# Patient Record
Sex: FEMALE | Race: BLACK OR AFRICAN AMERICAN | NOT HISPANIC OR LATINO | URBAN - METROPOLITAN AREA
[De-identification: names, ages, dates, MRNs, and addresses within clinical notes are randomized per-mention and may not be internally consistent; named-entity substitution may affect disease eponyms.]

---

## 2024-03-14 VITALS
OXYGEN SATURATION: 100 % | HEART RATE: 75 BPM | TEMPERATURE: 98 F | HEIGHT: 62 IN | WEIGHT: 123.02 LBS | DIASTOLIC BLOOD PRESSURE: 85 MMHG | SYSTOLIC BLOOD PRESSURE: 155 MMHG | RESPIRATION RATE: 15 BRPM

## 2024-03-14 RX ORDER — SODIUM CHLORIDE 9 MG/ML
1000 INJECTION INTRAMUSCULAR; INTRAVENOUS; SUBCUTANEOUS
Refills: 0 | Status: DISCONTINUED | OUTPATIENT
Start: 2024-03-19 | End: 2024-03-20

## 2024-03-14 RX ORDER — CHLORHEXIDINE GLUCONATE 213 G/1000ML
1 SOLUTION TOPICAL ONCE
Refills: 0 | Status: DISCONTINUED | OUTPATIENT
Start: 2024-03-19 | End: 2024-03-20

## 2024-03-14 NOTE — H&P ADULT - NSHPLABSRESULTS_GEN_ALL_CORE
13.8   9.28  )-----------( 234      ( 19 Mar 2024 08:05 )             40.7       03-19    141  |  102  |  16  ----------------------------<  128<H>  3.7   |  27  |  0.79    Ca    10.7<H>      19 Mar 2024 08:05    TPro  8.7<H>  /  Alb  4.8  /  TBili  1.4<H>  /  DBili  x   /  AST  26  /  ALT  13  /  AlkPhos  109  03-19      PT/INR - ( 19 Mar 2024 08:05 )   PT: 10.9 sec;   INR: 0.95          PTT - ( 19 Mar 2024 08:05 )  PTT:30.6 sec    CARDIAC MARKERS ( 19 Mar 2024 08:05 )  x     / x     / 173 U/L / x     / 3.0 ng/mL        Urinalysis Basic - ( 19 Mar 2024 08:05 )    Color: x / Appearance: x / SG: x / pH: x  Gluc: 128 mg/dL / Ketone: x  / Bili: x / Urobili: x   Blood: x / Protein: x / Nitrite: x   Leuk Esterase: x / RBC: x / WBC x   Sq Epi: x / Non Sq Epi: x / Bacteria: x

## 2024-03-14 NOTE — H&P ADULT - ASSESSMENT
62 yo F Creole speaking PMHx of CAD with LIN x 1 to LAD 2018, HTN, HLD, pre DM presents for [facility] with staged PCI of LAD at [facility] in light of risk factors, CCS class III symptoms and known residual disease.    EKG: NSR at rate of 70bpm  ASA: III  Mallampati class: III   Anginal Class: III    -No Known Allergies    -H/H = 13.8/40.7  . Pt denies BRBPR, hematuria, hematochezia, melena. Pt endorses compliance w/ home Brilinta 60mg and Plavix 75mg stating last dose was today 3/19/24. Pt was loaded with Brilinta 180mg x1 and XXX.      -BUN/Cr = 16/0.79  Euvolemic on exam. IV NS @ 250 cc bolus followed by 75cc/hr x2  hrs started pre procedure    Sedation Plan:   Moderate  Patient Is Suitable Candidate For Sedation?     Yes    Risks & benefits of procedure and alternative therapy have been explained to the patient using Creole  #[id number] but not limited to: allergic reaction, bleeding with possible need for blood transfusion, infection, renal and vascular compromise, limb damage, arrhythmia, stroke, vessel dissection/perforation, myocardial infarction, and emergent CABG. Informed consent obtained at bedside and included in chart. 60 yo F Creole speaking PMHx of CAD with LIN x 1 to LAD 2018, HTN, HLD, pre DM presents for [facility] with staged PCI of LAD at [facility] in light of risk factors, CCS class III symptoms and known residual disease.    EKG: NSR at rate of 70bpm  ASA: III  Mallampati class: III   Anginal Class: III    -No Known Allergies    -H/H = 13.8/40.7  . Pt denies BRBPR, hematuria, hematochezia, melena. Pt endorses poor compliance w/ home Brilinta 60mg (last dose today 3/19/24) and Plavix 75mg, intermittently aspirin. Pt was loaded with Brilinta 180mg x1 and ASA 325mg x1.       -BUN/Cr = 16/0.79  Euvolemic on exam. IV NS @ 250 cc bolus followed by 75cc/hr x2  hrs started pre procedure    Sedation Plan:   Moderate  Patient Is Suitable Candidate For Sedation?     Yes    Risks & benefits of procedure and alternative therapy have been explained to the patient using Creole  #[id number] but not limited to: allergic reaction, bleeding with possible need for blood transfusion, infection, renal and vascular compromise, limb damage, arrhythmia, stroke, vessel dissection/perforation, myocardial infarction, and emergent CABG. Informed consent obtained at bedside and included in chart.

## 2024-03-14 NOTE — H&P ADULT - HISTORY OF PRESENT ILLNESS
Cardiologist: Dr. Huber Nowak  Pharmacy:   Escort:      60 yo F Creole  #[id number] PMHx of CAD with LIN x 1 to LAD 2018, HTN, HLD, pre DM, presents in light of chest pain that occurs at any time, lasts 20 minutes and radiates to L shouldr and assoicated dyspnea on exertion. Pt able to walk 4 blocks prior to developing symptoms. Pt ___ denies palpitations, orthopnea, PND, leg edema, n/v/d, fever, chills, hematochezia, hematemesis, melena, BRBPR and other symptoms.     [facility] 12/22/23: LM with moderate diffuse disease. oLAD 40%, Ostial to midLAD 25% with stent in place. MidLAD 50%. Mid to distal LAD 80% stenosed. Prox LCx 30%. Mid to distal LCx 50%. prox to mid RCA 50%. dRCA 75%.   NST 4/13/23: Small area of anterior wall ischemia.   TTE (per MD note) 6/2023: EF=68%. Mild MR.     In light of pt's risk factors, CCS class XX symptoms and abnormal prior [facility], pt presents for [facility] with staged PCI of LAD at [facility].        Cardiologist: Dr. Huber Nowak  Pharmacy: [facility]  Escort: Daughter     62 yo F Creole speaking PMHx of CAD with LIN x 1 to LAD 2018, HTN, HLD, pre DM, presents in light of chest pain that occurs at any time, lasts 20 minutes and radiates to L should and associated dyspnea on exertion. Pt able to walk 4 blocks prior to developing symptoms.Pt denies palpitations, orthopnea, PND, leg edema, n/v/d, fever, chills, hematochezia, hematemesis, melena, BRBPR and other symptoms.     [facility] 12/22/23: LM with moderate diffuse disease. oLAD 40%, Ostial to midLAD 25% with stent in place. MidLAD 50%. Mid to distal LAD 80% stenosed. Prox LCx 30%. Mid to distal LCx 50%. prox to mid RCA 50%. dRCA 75%.   NST 4/13/23: Small area of anterior wall ischemia.   TTE (per MD note) 6/2023: EF=68%. Mild MR.     In light of pt's risk factors, CCS class III symptoms and abnormal prior [facility], pt presents for [facility] with staged PCI of LAD at [facility].

## 2024-03-19 ENCOUNTER — INPATIENT (INPATIENT)
Facility: HOSPITAL | Age: 64
LOS: 0 days | Discharge: HOME | DRG: 324 | End: 2024-03-20
Attending: INTERNAL MEDICINE | Admitting: INTERNAL MEDICINE
Payer: COMMERCIAL

## 2024-03-19 LAB
A1C WITH ESTIMATED AVERAGE GLUCOSE RESULT: 5.6 % — SIGNIFICANT CHANGE UP (ref 4–5.6)
ALBUMIN SERPL ELPH-MCNC: 4.8 G/DL — SIGNIFICANT CHANGE UP (ref 3.3–5)
ALP SERPL-CCNC: 109 U/L — SIGNIFICANT CHANGE UP (ref 40–120)
ALT FLD-CCNC: 13 U/L — SIGNIFICANT CHANGE UP (ref 10–45)
ANION GAP SERPL CALC-SCNC: 12 MMOL/L — SIGNIFICANT CHANGE UP (ref 5–17)
APTT BLD: 30.6 SEC — SIGNIFICANT CHANGE UP (ref 24.5–35.6)
AST SERPL-CCNC: 26 U/L — SIGNIFICANT CHANGE UP (ref 10–40)
BASOPHILS # BLD AUTO: 0.03 K/UL — SIGNIFICANT CHANGE UP (ref 0–0.2)
BASOPHILS NFR BLD AUTO: 0.3 % — SIGNIFICANT CHANGE UP (ref 0–2)
BILIRUB SERPL-MCNC: 1.4 MG/DL — HIGH (ref 0.2–1.2)
BUN SERPL-MCNC: 16 MG/DL — SIGNIFICANT CHANGE UP (ref 7–23)
CALCIUM SERPL-MCNC: 10.7 MG/DL — HIGH (ref 8.4–10.5)
CHLORIDE SERPL-SCNC: 102 MMOL/L — SIGNIFICANT CHANGE UP (ref 96–108)
CHOLEST SERPL-MCNC: 157 MG/DL — SIGNIFICANT CHANGE UP
CK MB CFR SERPL CALC: 3 NG/ML — SIGNIFICANT CHANGE UP (ref 0–6.7)
CK SERPL-CCNC: 173 U/L — HIGH (ref 25–170)
CO2 SERPL-SCNC: 27 MMOL/L — SIGNIFICANT CHANGE UP (ref 22–31)
CREAT SERPL-MCNC: 0.79 MG/DL — SIGNIFICANT CHANGE UP (ref 0.5–1.3)
EGFR: 83 ML/MIN/1.73M2 — SIGNIFICANT CHANGE UP
EOSINOPHIL # BLD AUTO: 0.12 K/UL — SIGNIFICANT CHANGE UP (ref 0–0.5)
EOSINOPHIL NFR BLD AUTO: 1.3 % — SIGNIFICANT CHANGE UP (ref 0–6)
ESTIMATED AVERAGE GLUCOSE: 114 MG/DL — SIGNIFICANT CHANGE UP (ref 68–114)
GLUCOSE SERPL-MCNC: 128 MG/DL — HIGH (ref 70–99)
HCT VFR BLD CALC: 40.7 % — SIGNIFICANT CHANGE UP (ref 34.5–45)
HDLC SERPL-MCNC: 56 MG/DL — SIGNIFICANT CHANGE UP
HGB BLD-MCNC: 13.8 G/DL — SIGNIFICANT CHANGE UP (ref 11.5–15.5)
IMM GRANULOCYTES NFR BLD AUTO: 0.3 % — SIGNIFICANT CHANGE UP (ref 0–0.9)
INR BLD: 0.95 — SIGNIFICANT CHANGE UP (ref 0.85–1.18)
LIPID PNL WITH DIRECT LDL SERPL: 83 MG/DL — SIGNIFICANT CHANGE UP
LYMPHOCYTES # BLD AUTO: 3.57 K/UL — HIGH (ref 1–3.3)
LYMPHOCYTES # BLD AUTO: 38.5 % — SIGNIFICANT CHANGE UP (ref 13–44)
MCHC RBC-ENTMCNC: 30.6 PG — SIGNIFICANT CHANGE UP (ref 27–34)
MCHC RBC-ENTMCNC: 33.9 GM/DL — SIGNIFICANT CHANGE UP (ref 32–36)
MCV RBC AUTO: 90.2 FL — SIGNIFICANT CHANGE UP (ref 80–100)
MONOCYTES # BLD AUTO: 0.69 K/UL — SIGNIFICANT CHANGE UP (ref 0–0.9)
MONOCYTES NFR BLD AUTO: 7.4 % — SIGNIFICANT CHANGE UP (ref 2–14)
NEUTROPHILS # BLD AUTO: 4.84 K/UL — SIGNIFICANT CHANGE UP (ref 1.8–7.4)
NEUTROPHILS NFR BLD AUTO: 52.2 % — SIGNIFICANT CHANGE UP (ref 43–77)
NON HDL CHOLESTEROL: 101 MG/DL — SIGNIFICANT CHANGE UP
NRBC # BLD: 0 /100 WBCS — SIGNIFICANT CHANGE UP (ref 0–0)
PLATELET # BLD AUTO: 234 K/UL — SIGNIFICANT CHANGE UP (ref 150–400)
POTASSIUM SERPL-MCNC: 3.7 MMOL/L — SIGNIFICANT CHANGE UP (ref 3.5–5.3)
POTASSIUM SERPL-SCNC: 3.7 MMOL/L — SIGNIFICANT CHANGE UP (ref 3.5–5.3)
PROT SERPL-MCNC: 8.7 G/DL — HIGH (ref 6–8.3)
PROTHROM AB SERPL-ACNC: 10.9 SEC — SIGNIFICANT CHANGE UP (ref 9.5–13)
RBC # BLD: 4.51 M/UL — SIGNIFICANT CHANGE UP (ref 3.8–5.2)
RBC # FLD: 12.5 % — SIGNIFICANT CHANGE UP (ref 10.3–14.5)
SODIUM SERPL-SCNC: 141 MMOL/L — SIGNIFICANT CHANGE UP (ref 135–145)
TRIGL SERPL-MCNC: 89 MG/DL — SIGNIFICANT CHANGE UP
WBC # BLD: 9.28 K/UL — SIGNIFICANT CHANGE UP (ref 3.8–10.5)
WBC # FLD AUTO: 9.28 K/UL — SIGNIFICANT CHANGE UP (ref 3.8–10.5)

## 2024-03-19 RX ORDER — POTASSIUM CHLORIDE 20 MEQ
10 PACKET (EA) ORAL ONCE
Refills: 0 | Status: COMPLETED | OUTPATIENT
Start: 2024-03-19 | End: 2024-03-19

## 2024-03-19 RX ORDER — SODIUM CHLORIDE 9 MG/ML
250 INJECTION INTRAMUSCULAR; INTRAVENOUS; SUBCUTANEOUS ONCE
Refills: 0 | Status: COMPLETED | OUTPATIENT
Start: 2024-03-19 | End: 2024-03-19

## 2024-03-19 RX ORDER — ASPIRIN/CALCIUM CARB/MAGNESIUM 324 MG
325 TABLET ORAL ONCE
Refills: 0 | Status: COMPLETED | OUTPATIENT
Start: 2024-03-19 | End: 2024-03-19

## 2024-03-19 RX ORDER — ASPIRIN/CALCIUM CARB/MAGNESIUM 324 MG
81 TABLET ORAL DAILY
Refills: 0 | Status: DISCONTINUED | OUTPATIENT
Start: 2024-03-19 | End: 2024-03-19

## 2024-03-19 RX ORDER — POTASSIUM CHLORIDE 20 MEQ
20 PACKET (EA) ORAL ONCE
Refills: 0 | Status: COMPLETED | OUTPATIENT
Start: 2024-03-19 | End: 2024-03-19

## 2024-03-19 RX ORDER — TICAGRELOR 90 MG/1
1 TABLET ORAL
Qty: 60 | Refills: 0
Start: 2024-03-19 | End: 2024-04-17

## 2024-03-19 RX ORDER — CLOPIDOGREL BISULFATE 75 MG/1
600 TABLET, FILM COATED ORAL ONCE
Refills: 0 | Status: DISCONTINUED | OUTPATIENT
Start: 2024-03-19 | End: 2024-03-19

## 2024-03-19 RX ORDER — ATORVASTATIN CALCIUM 80 MG/1
40 TABLET, FILM COATED ORAL AT BEDTIME
Refills: 0 | Status: DISCONTINUED | OUTPATIENT
Start: 2024-03-19 | End: 2024-03-20

## 2024-03-19 RX ORDER — TICAGRELOR 90 MG/1
180 TABLET ORAL ONCE
Refills: 0 | Status: COMPLETED | OUTPATIENT
Start: 2024-03-19 | End: 2024-03-19

## 2024-03-19 RX ORDER — SODIUM CHLORIDE 9 MG/ML
1000 INJECTION INTRAMUSCULAR; INTRAVENOUS; SUBCUTANEOUS
Refills: 0 | Status: DISCONTINUED | OUTPATIENT
Start: 2024-03-19 | End: 2024-03-20

## 2024-03-19 RX ORDER — TICAGRELOR 90 MG/1
90 TABLET ORAL EVERY 12 HOURS
Refills: 0 | Status: DISCONTINUED | OUTPATIENT
Start: 2024-03-19 | End: 2024-03-20

## 2024-03-19 RX ORDER — METOPROLOL TARTRATE 50 MG
50 TABLET ORAL DAILY
Refills: 0 | Status: DISCONTINUED | OUTPATIENT
Start: 2024-03-19 | End: 2024-03-20

## 2024-03-19 RX ORDER — ISOSORBIDE MONONITRATE 60 MG/1
1 TABLET, EXTENDED RELEASE ORAL
Refills: 0 | DISCHARGE

## 2024-03-19 RX ORDER — LOSARTAN/HYDROCHLOROTHIAZIDE 100MG-25MG
1 TABLET ORAL
Refills: 0 | DISCHARGE

## 2024-03-19 RX ORDER — ASPIRIN/CALCIUM CARB/MAGNESIUM 324 MG
1 TABLET ORAL
Refills: 0 | DISCHARGE

## 2024-03-19 RX ORDER — ASPIRIN/CALCIUM CARB/MAGNESIUM 324 MG
81 TABLET ORAL DAILY
Refills: 0 | Status: DISCONTINUED | OUTPATIENT
Start: 2024-03-20 | End: 2024-03-20

## 2024-03-19 RX ORDER — LOSARTAN POTASSIUM 100 MG/1
100 TABLET, FILM COATED ORAL DAILY
Refills: 0 | Status: DISCONTINUED | OUTPATIENT
Start: 2024-03-19 | End: 2024-03-20

## 2024-03-19 RX ORDER — ASPIRIN/CALCIUM CARB/MAGNESIUM 324 MG
325 TABLET ORAL ONCE
Refills: 0 | Status: DISCONTINUED | OUTPATIENT
Start: 2024-03-19 | End: 2024-03-19

## 2024-03-19 RX ADMIN — SODIUM CHLORIDE 75 MILLILITER(S): 9 INJECTION INTRAMUSCULAR; INTRAVENOUS; SUBCUTANEOUS at 09:23

## 2024-03-19 RX ADMIN — Medication 20 MILLIEQUIVALENT(S): at 09:23

## 2024-03-19 RX ADMIN — SODIUM CHLORIDE 1000 MILLILITER(S): 9 INJECTION INTRAMUSCULAR; INTRAVENOUS; SUBCUTANEOUS at 09:24

## 2024-03-19 RX ADMIN — SODIUM CHLORIDE 165 MILLILITER(S): 9 INJECTION INTRAMUSCULAR; INTRAVENOUS; SUBCUTANEOUS at 13:11

## 2024-03-19 RX ADMIN — Medication 10 MILLIEQUIVALENT(S): at 09:23

## 2024-03-19 RX ADMIN — ATORVASTATIN CALCIUM 40 MILLIGRAM(S): 80 TABLET, FILM COATED ORAL at 22:14

## 2024-03-19 RX ADMIN — TICAGRELOR 90 MILLIGRAM(S): 90 TABLET ORAL at 18:19

## 2024-03-19 RX ADMIN — Medication 325 MILLIGRAM(S): at 10:21

## 2024-03-19 RX ADMIN — TICAGRELOR 180 MILLIGRAM(S): 90 TABLET ORAL at 09:23

## 2024-03-19 NOTE — DISCHARGE NOTE PROVIDER - HOSPITAL COURSE
INCOMPLETE    60 yo F Creole speaking PMHx of CAD with LIN x 1 to LAD 2018, HTN, HLD, pre DM, who presented to [facility] for recommended cardiac cath w/ possible intervention if clinically indicated, in light of pts risk factors, CCS class III anginal symptoms and abnormal prior [facility].    Pt now s/p cardiac cath (3/19/24): Shockwave and LIN x 2 mLAD (80% long lesion), dissection distal edge of stent treated with LIN (total of 3 stents), patent stent pLAD, mild LCx disease, EDP 4 mmHg. Access: L radial TR.     Pt admitted overnight for observation and telemetry monitoring. Pt seen and examined at bedside this AM without any complaints or events overnight, VSS, labs and telemetry reviewed and pt stable for discharge as discussed with Dr. Agudelo. Pt has received appropriate discharge instructions, including medication regimen, access site management and follow up with Dr. Nowak in 1-2 weeks.    Discharge medications: ASA 81mg QD, Brilinta 90 mg BID, Lipitor 40 mg QHS, Losartan-HCTZ 100mg-25mg QD, Toprol XL 50 mg QD    Cardiac Rehab (Post PCI): Education on benefits of Cardiac Rehab provided to patient: Yes, Referral and Prescription Given for Cardiac Rehab: Yes, Pt given list of locations & instructed to contact their insurance company to review list of participating providers.    GLP-1 receptor agonist/SGLT2 inhibitor meds discussed w/ patients and encouraged to discuss further with PMD or Endocrinologist at next visit.      Pt discharge copies detail cardiovascular history, medications, testing/treatments, OR patient has created a patient portal account and instructed to provide their records at their 1st appointment.   60 yo F Creole speaking PMHx of CAD with LIN x 1 to LAD 2018, HTN, HLD, pre DM, who presented to [facility] for recommended cardiac cath w/ possible intervention if clinically indicated, in light of pts risk factors, CCS class III anginal symptoms and abnormal prior [facility].    Pt now s/p cardiac cath (3/19/24): Shockwave and LIN x 2 mLAD (80% long lesion), dissection distal edge of stent treated with LIN (total of 3 stents), patent stent pLAD, mild LCx disease, EDP 4 mmHg. Access: L radial TR.     Pt admitted overnight for observation and telemetry monitoring. Pt seen and examined at bedside this AM without any complaints or events overnight, VSS, labs and telemetry reviewed and pt stable for discharge as discussed with Dr. Agudelo. Pt has received appropriate discharge instructions, including medication regimen, access site management and follow up with Dr. Nowak in 1-2 weeks.    Discharge medications: ASA 81mg QD, Brilinta 90 mg BID, Lipitor 40 mg QHS, Losartan-HCTZ 100mg-25mg QD, Toprol XL 50 mg QD    Cardiac Rehab (Post PCI): Education on benefits of Cardiac Rehab provided to patient: Yes, Referral and Prescription Given for Cardiac Rehab: Yes, Pt given list of locations & instructed to contact their insurance company to review list of participating providers.    GLP-1 receptor agonist/SGLT2 inhibitor meds discussed w/ patients and encouraged to discuss further with PMD or Endocrinologist at next visit.      Pt discharge copies detail cardiovascular history, medications, testing/treatments, OR patient has created a patient portal account and instructed to provide their records at their 1st appointment.

## 2024-03-19 NOTE — DISCHARGE NOTE PROVIDER - NSDCCPCAREPLAN_GEN_ALL_CORE_FT
PRINCIPAL DISCHARGE DIAGNOSIS  Diagnosis: CAD (coronary artery disease)  Assessment and Plan of Treatment: You were found to have blockages in the arteries of your heart, also known as Coronary Artery Disease. You underwent a cardiac catheterization on 3/19/24 and received three stents to the mid left anterior descending artery.   PLEASE CONTINUE ASPIRIN 81MG DAILY AND BRILINTA 90 MG TWICE DAILY. DO NOT STOP THESE MEDICATIONS FOR ANY REASON AS THEY ARE KEEPING YOUR STENT OPEN AND PREVENTING A HEART ATTACK.   Avoid strenuous activity or heavy lifting anything more than 5lbs for the next five days. Do not take a bath or swim for the next five days; you may shower. For any bleeding or hematoma formation (hardened blood collection under the skin) at the access site of your left wrist please hold pressure and go to the emergency room. Please follow up with Dr. Nowak in 1-2 weeks. For recurrent chest pain, please call your doctor or go to the emergency room.   We have provided you with a prescription for cardiac rehab which is medically supervised exercise program for your heart and has been shown to improve the quantity and quality of life of people with heart disease like yours. You should attend cardiac rehab 3 times per week for 12 weeks. We have provided you with a list of nearby facilities. Please call your insurance carrier to determine which of these facilities are covered under your plan. Please bring this prescription with you to your follow up appointment with your cardiologist who can then further assist you to enroll into a cardiac rehab program.         SECONDARY DISCHARGE DIAGNOSES  Diagnosis: HTN (hypertension)  Assessment and Plan of Treatment: Please continue Losartan-Hydrochlorothiazide 100mg-25mg daily and Metoprolol succinate 50 mg daily as listed to keep your blood pressure controlled. For blood pressure that is too high or too low please see your doctor or go to the emergency room as necessary.    Diagnosis: HLD (hyperlipidemia)  Assessment and Plan of Treatment: Please continue Atorvastatin 40 mg at bedtime to keep your cholesterol low. High cholesterol contributes to heart disease.

## 2024-03-19 NOTE — DISCHARGE NOTE PROVIDER - CARE PROVIDER_API CALL
Huber Nowak  Cardiovascular Disease  [address] [place] [id number]  Phone: [phone]  Fax: [phone]  Established Patient  Follow Up Time: 1 week

## 2024-03-19 NOTE — DISCHARGE NOTE PROVIDER - NSDCMRMEDTOKEN_GEN_ALL_CORE_FT
Brilinta (ticagrelor) 60 mg oral tablet: 1 tab(s) orally 2 times a day  Brilinta (ticagrelor) 90 mg oral tablet: 1 tab(s) orally every 12 hours  Lipitor 40 mg oral tablet: 1 tab(s) orally once a day (at bedtime)  losartan-hydroCHLOROthiazide 100 mg-25 mg oral tablet: 1 tab(s) orally once a day  Plavix 75 mg oral tablet: 1 tab(s) orally once a day  Toprol-XL 50 mg oral tablet, extended release: 1 tab(s) orally once a day   Aspirin EC 81 mg oral delayed release tablet: 1 tab(s) orally once a day  atorvastatin 80 mg oral tablet: 1 tab(s) orally once a day  Cardiac Rehabilitation: 3x / week for 12 weeks for diagnosis of coronary artery disease (cardiologist: Dr. Huber Nowak) MDD: .  losartan-hydroCHLOROthiazide 100 mg-25 mg oral tablet: 1 tab(s) orally once a day  ticagrelor 90 mg oral tablet: 1 tab(s) orally once a day  Toprol-XL 50 mg oral tablet, extended release: 1 tab(s) orally once a day

## 2024-03-19 NOTE — PATIENT PROFILE ADULT - FALL HARM RISK - UNIVERSAL INTERVENTIONS
Bed in lowest position, wheels locked, appropriate side rails in place/Call bell, personal items and telephone in reach/Instruct patient to call for assistance before getting out of bed or chair/Non-slip footwear when patient is out of bed/[place] to call system/Physically safe environment - no spills, clutter or unnecessary equipment/Purposeful Proactive Rounding/Room/bathroom lighting operational, light cord in reach

## 2024-03-20 VITALS
SYSTOLIC BLOOD PRESSURE: 112 MMHG | TEMPERATURE: 98 F | RESPIRATION RATE: 18 BRPM | OXYGEN SATURATION: 100 % | HEART RATE: 68 BPM | DIASTOLIC BLOOD PRESSURE: 61 MMHG

## 2024-03-20 LAB
ALBUMIN SERPL ELPH-MCNC: 3.8 G/DL — SIGNIFICANT CHANGE UP (ref 3.3–5)
ALP SERPL-CCNC: 86 U/L — SIGNIFICANT CHANGE UP (ref 40–120)
ALT FLD-CCNC: 10 U/L — SIGNIFICANT CHANGE UP (ref 10–45)
ANION GAP SERPL CALC-SCNC: 8 MMOL/L — SIGNIFICANT CHANGE UP (ref 5–17)
AST SERPL-CCNC: 22 U/L — SIGNIFICANT CHANGE UP (ref 10–40)
BASOPHILS # BLD AUTO: 0.03 K/UL — SIGNIFICANT CHANGE UP (ref 0–0.2)
BASOPHILS NFR BLD AUTO: 0.4 % — SIGNIFICANT CHANGE UP (ref 0–2)
BILIRUB SERPL-MCNC: 1.7 MG/DL — HIGH (ref 0.2–1.2)
BUN SERPL-MCNC: 14 MG/DL — SIGNIFICANT CHANGE UP (ref 7–23)
CALCIUM SERPL-MCNC: 9.7 MG/DL — SIGNIFICANT CHANGE UP (ref 8.4–10.5)
CHLORIDE SERPL-SCNC: 107 MMOL/L — SIGNIFICANT CHANGE UP (ref 96–108)
CO2 SERPL-SCNC: 24 MMOL/L — SIGNIFICANT CHANGE UP (ref 22–31)
CREAT SERPL-MCNC: 0.81 MG/DL — SIGNIFICANT CHANGE UP (ref 0.5–1.3)
EGFR: 81 ML/MIN/1.73M2 — SIGNIFICANT CHANGE UP
EOSINOPHIL # BLD AUTO: 0.16 K/UL — SIGNIFICANT CHANGE UP (ref 0–0.5)
EOSINOPHIL NFR BLD AUTO: 2 % — SIGNIFICANT CHANGE UP (ref 0–6)
GLUCOSE SERPL-MCNC: 86 MG/DL — SIGNIFICANT CHANGE UP (ref 70–99)
HCT VFR BLD CALC: 36.5 % — SIGNIFICANT CHANGE UP (ref 34.5–45)
HCV AB S/CO SERPL IA: 0.05 S/CO — SIGNIFICANT CHANGE UP
HCV AB SERPL-IMP: SIGNIFICANT CHANGE UP
HGB BLD-MCNC: 12.1 G/DL — SIGNIFICANT CHANGE UP (ref 11.5–15.5)
IMM GRANULOCYTES NFR BLD AUTO: 0.4 % — SIGNIFICANT CHANGE UP (ref 0–0.9)
LYMPHOCYTES # BLD AUTO: 2.72 K/UL — SIGNIFICANT CHANGE UP (ref 1–3.3)
LYMPHOCYTES # BLD AUTO: 33.3 % — SIGNIFICANT CHANGE UP (ref 13–44)
MAGNESIUM SERPL-MCNC: 1.8 MG/DL — SIGNIFICANT CHANGE UP (ref 1.6–2.6)
MCHC RBC-ENTMCNC: 31.1 PG — SIGNIFICANT CHANGE UP (ref 27–34)
MCHC RBC-ENTMCNC: 33.2 GM/DL — SIGNIFICANT CHANGE UP (ref 32–36)
MCV RBC AUTO: 93.8 FL — SIGNIFICANT CHANGE UP (ref 80–100)
MONOCYTES # BLD AUTO: 0.74 K/UL — SIGNIFICANT CHANGE UP (ref 0–0.9)
MONOCYTES NFR BLD AUTO: 9.1 % — SIGNIFICANT CHANGE UP (ref 2–14)
NEUTROPHILS # BLD AUTO: 4.49 K/UL — SIGNIFICANT CHANGE UP (ref 1.8–7.4)
NEUTROPHILS NFR BLD AUTO: 54.8 % — SIGNIFICANT CHANGE UP (ref 43–77)
NRBC # BLD: 0 /100 WBCS — SIGNIFICANT CHANGE UP (ref 0–0)
PLATELET # BLD AUTO: 202 K/UL — SIGNIFICANT CHANGE UP (ref 150–400)
POTASSIUM SERPL-MCNC: 4.3 MMOL/L — SIGNIFICANT CHANGE UP (ref 3.5–5.3)
POTASSIUM SERPL-SCNC: 4.3 MMOL/L — SIGNIFICANT CHANGE UP (ref 3.5–5.3)
PROT SERPL-MCNC: 7 G/DL — SIGNIFICANT CHANGE UP (ref 6–8.3)
RBC # BLD: 3.89 M/UL — SIGNIFICANT CHANGE UP (ref 3.8–5.2)
RBC # FLD: 12.8 % — SIGNIFICANT CHANGE UP (ref 10.3–14.5)
SODIUM SERPL-SCNC: 139 MMOL/L — SIGNIFICANT CHANGE UP (ref 135–145)
WBC # BLD: 8.17 K/UL — SIGNIFICANT CHANGE UP (ref 3.8–10.5)
WBC # FLD AUTO: 8.17 K/UL — SIGNIFICANT CHANGE UP (ref 3.8–10.5)

## 2024-03-20 RX ORDER — TICAGRELOR 90 MG/1
1 TABLET ORAL
Refills: 0 | DISCHARGE

## 2024-03-20 RX ORDER — METOPROLOL TARTRATE 50 MG
1 TABLET ORAL
Qty: 30 | Refills: 2
Start: 2024-03-20 | End: 2024-06-17

## 2024-03-20 RX ORDER — CLOPIDOGREL BISULFATE 75 MG/1
1 TABLET, FILM COATED ORAL
Refills: 0 | DISCHARGE

## 2024-03-20 RX ORDER — LOSARTAN/HYDROCHLOROTHIAZIDE 100MG-25MG
1 TABLET ORAL
Refills: 0 | DISCHARGE

## 2024-03-20 RX ORDER — TICAGRELOR 90 MG/1
1 TABLET ORAL
Qty: 60 | Refills: 11
Start: 2024-03-20 | End: 2025-03-14

## 2024-03-20 RX ORDER — ATORVASTATIN CALCIUM 80 MG/1
1 TABLET, FILM COATED ORAL
Refills: 0 | DISCHARGE

## 2024-03-20 RX ORDER — LOSARTAN/HYDROCHLOROTHIAZIDE 100MG-25MG
1 TABLET ORAL
Qty: 30 | Refills: 2
Start: 2024-03-20 | End: 2024-06-17

## 2024-03-20 RX ORDER — METOPROLOL TARTRATE 50 MG
1 TABLET ORAL
Refills: 0 | DISCHARGE

## 2024-03-20 RX ORDER — ASPIRIN/CALCIUM CARB/MAGNESIUM 324 MG
1 TABLET ORAL
Qty: 30 | Refills: 11
Start: 2024-03-20 | End: 2025-03-14

## 2024-03-20 RX ORDER — TICAGRELOR 90 MG/1
1 TABLET ORAL
Qty: 30 | Refills: 11
Start: 2024-03-20 | End: 2025-03-14

## 2024-03-20 RX ORDER — ATORVASTATIN CALCIUM 80 MG/1
1 TABLET, FILM COATED ORAL
Qty: 30 | Refills: 2
Start: 2024-03-20 | End: 2024-06-17

## 2024-03-20 RX ORDER — MAGNESIUM OXIDE 400 MG ORAL TABLET 241.3 MG
800 TABLET ORAL ONCE
Refills: 0 | Status: COMPLETED | OUTPATIENT
Start: 2024-03-20 | End: 2024-03-20

## 2024-03-20 RX ADMIN — MAGNESIUM OXIDE 400 MG ORAL TABLET 800 MILLIGRAM(S): 241.3 TABLET ORAL at 12:59

## 2024-03-20 RX ADMIN — LOSARTAN POTASSIUM 100 MILLIGRAM(S): 100 TABLET, FILM COATED ORAL at 06:12

## 2024-03-20 RX ADMIN — TICAGRELOR 90 MILLIGRAM(S): 90 TABLET ORAL at 06:13

## 2024-03-20 RX ADMIN — Medication 81 MILLIGRAM(S): at 12:59

## 2024-03-20 RX ADMIN — Medication 50 MILLIGRAM(S): at 06:13

## 2024-03-20 NOTE — DISCHARGE NOTE NURSING/CASE MANAGEMENT/SOCIAL WORK - PATIENT PORTAL LINK FT
You can access the FollowMyHealth Patient Portal offered by [facility] by registering at the following website: [email or web address]. By joining [facility]’s FollowMyHealth portal, you will also be able to view your health information using other applications (apps) compatible with our system.

## 2024-03-21 LAB
ISTAT ACTK (ACTIVATED CLOTTING TIME KAOLIN): 250 SEC — HIGH (ref 74–137)
ISTAT ACTK (ACTIVATED CLOTTING TIME KAOLIN): 282 SEC — HIGH (ref 74–137)

## 2024-03-25 DIAGNOSIS — I25.84 CORONARY ATHEROSCLEROSIS DUE TO CALCIFIED CORONARY LESION: ICD-10-CM

## 2024-03-25 DIAGNOSIS — I10 ESSENTIAL (PRIMARY) HYPERTENSION: ICD-10-CM

## 2024-03-25 DIAGNOSIS — I25.10 ATHEROSCLEROTIC HEART DISEASE OF NATIVE CORONARY ARTERY WITHOUT ANGINA PECTORIS: ICD-10-CM

## 2024-03-25 DIAGNOSIS — Z95.5 PRESENCE OF CORONARY ANGIOPLASTY IMPLANT AND GRAFT: ICD-10-CM

## 2024-03-25 DIAGNOSIS — Z79.02 LONG TERM (CURRENT) USE OF ANTITHROMBOTICS/ANTIPLATELETS: ICD-10-CM

## 2024-03-25 DIAGNOSIS — E78.5 HYPERLIPIDEMIA, UNSPECIFIED: ICD-10-CM

## 2024-03-25 DIAGNOSIS — R73.03 PREDIABETES: ICD-10-CM

## 2024-05-06 ENCOUNTER — EMERGENCY (EMERGENCY)
Facility: HOSPITAL | Age: 64
LOS: 0 days | Discharge: ANOTHER HOSPITAL | End: 2024-05-06
Attending: STUDENT IN AN ORGANIZED HEALTH CARE EDUCATION/TRAINING PROGRAM
Payer: MEDICAID

## 2024-05-06 ENCOUNTER — INPATIENT (INPATIENT)
Facility: HOSPITAL | Age: 64
LOS: 3 days | Discharge: HOME | End: 2024-05-10
Attending: HOSPITALIST | Admitting: HOSPITALIST
Payer: MEDICAID

## 2024-05-06 VITALS
RESPIRATION RATE: 16 BRPM | OXYGEN SATURATION: 100 % | HEIGHT: 62 IN | SYSTOLIC BLOOD PRESSURE: 152 MMHG | HEART RATE: 111 BPM | TEMPERATURE: 99 F | DIASTOLIC BLOOD PRESSURE: 85 MMHG

## 2024-05-06 VITALS
WEIGHT: 119.93 LBS | RESPIRATION RATE: 20 BRPM | SYSTOLIC BLOOD PRESSURE: 156 MMHG | HEART RATE: 121 BPM | DIASTOLIC BLOOD PRESSURE: 85 MMHG | TEMPERATURE: 98 F | OXYGEN SATURATION: 100 %

## 2024-05-06 VITALS
SYSTOLIC BLOOD PRESSURE: 151 MMHG | TEMPERATURE: 99 F | DIASTOLIC BLOOD PRESSURE: 74 MMHG | RESPIRATION RATE: 18 BRPM | HEART RATE: 98 BPM | OXYGEN SATURATION: 100 %

## 2024-05-06 DIAGNOSIS — I10 ESSENTIAL (PRIMARY) HYPERTENSION: ICD-10-CM

## 2024-05-06 DIAGNOSIS — R00.2 PALPITATIONS: ICD-10-CM

## 2024-05-06 DIAGNOSIS — D64.9 ANEMIA, UNSPECIFIED: ICD-10-CM

## 2024-05-06 DIAGNOSIS — E78.5 HYPERLIPIDEMIA, UNSPECIFIED: ICD-10-CM

## 2024-05-06 DIAGNOSIS — R42 DIZZINESS AND GIDDINESS: ICD-10-CM

## 2024-05-06 DIAGNOSIS — I25.10 ATHEROSCLEROTIC HEART DISEASE OF NATIVE CORONARY ARTERY WITHOUT ANGINA PECTORIS: ICD-10-CM

## 2024-05-06 DIAGNOSIS — K92.2 GASTROINTESTINAL HEMORRHAGE, UNSPECIFIED: ICD-10-CM

## 2024-05-06 LAB
ABO RH CONFIRMATION: SIGNIFICANT CHANGE UP
ALBUMIN SERPL ELPH-MCNC: 3.1 G/DL — LOW (ref 3.3–5)
ALP SERPL-CCNC: 83 U/L — SIGNIFICANT CHANGE UP (ref 40–120)
ALT FLD-CCNC: 22 U/L — SIGNIFICANT CHANGE UP (ref 12–78)
ANION GAP SERPL CALC-SCNC: 8 MMOL/L — SIGNIFICANT CHANGE UP (ref 5–17)
APTT BLD: 27 SEC — SIGNIFICANT CHANGE UP (ref 24.5–35.6)
AST SERPL-CCNC: 25 U/L — SIGNIFICANT CHANGE UP (ref 15–37)
BASOPHILS # BLD AUTO: 0.04 K/UL — SIGNIFICANT CHANGE UP (ref 0–0.2)
BASOPHILS # BLD AUTO: 0.04 K/UL — SIGNIFICANT CHANGE UP (ref 0–0.2)
BASOPHILS NFR BLD AUTO: 0.3 % — SIGNIFICANT CHANGE UP (ref 0–2)
BASOPHILS NFR BLD AUTO: 0.4 % — SIGNIFICANT CHANGE UP (ref 0–2)
BILIRUB SERPL-MCNC: 1.1 MG/DL — SIGNIFICANT CHANGE UP (ref 0.2–1.2)
BLD GP AB SCN SERPL QL: SIGNIFICANT CHANGE UP
BUN SERPL-MCNC: 25 MG/DL — HIGH (ref 7–23)
CALCIUM SERPL-MCNC: 8.9 MG/DL — SIGNIFICANT CHANGE UP (ref 8.5–10.1)
CHLORIDE SERPL-SCNC: 111 MMOL/L — HIGH (ref 96–108)
CO2 SERPL-SCNC: 22 MMOL/L — SIGNIFICANT CHANGE UP (ref 22–31)
CREAT SERPL-MCNC: 0.7 MG/DL — SIGNIFICANT CHANGE UP (ref 0.5–1.3)
EGFR: 96 ML/MIN/1.73M2 — SIGNIFICANT CHANGE UP
EOSINOPHIL # BLD AUTO: 0.01 K/UL — SIGNIFICANT CHANGE UP (ref 0–0.5)
EOSINOPHIL # BLD AUTO: 0.13 K/UL — SIGNIFICANT CHANGE UP (ref 0–0.5)
EOSINOPHIL NFR BLD AUTO: 0.1 % — SIGNIFICANT CHANGE UP (ref 0–6)
EOSINOPHIL NFR BLD AUTO: 1.1 % — SIGNIFICANT CHANGE UP (ref 0–6)
FERRITIN SERPL-MCNC: 66 NG/ML — SIGNIFICANT CHANGE UP (ref 13–330)
FOLATE SERPL-MCNC: 16 NG/ML — SIGNIFICANT CHANGE UP
GLUCOSE SERPL-MCNC: 150 MG/DL — HIGH (ref 70–99)
HCT VFR BLD CALC: 20 % — CRITICAL LOW (ref 34.5–45)
HCT VFR BLD CALC: 22.9 % — LOW (ref 34.5–45)
HGB BLD-MCNC: 7 G/DL — CRITICAL LOW (ref 11.5–15.5)
HGB BLD-MCNC: 7.9 G/DL — LOW (ref 11.5–15.5)
IANC: 6.13 K/UL — SIGNIFICANT CHANGE UP (ref 1.8–7.4)
IMM GRANULOCYTES NFR BLD AUTO: 0.3 % — SIGNIFICANT CHANGE UP (ref 0–0.9)
IMM GRANULOCYTES NFR BLD AUTO: 0.6 % — SIGNIFICANT CHANGE UP (ref 0–0.9)
INR BLD: 1.03 RATIO — SIGNIFICANT CHANGE UP (ref 0.85–1.18)
IRON SATN MFR SERPL: 132 UG/DL — SIGNIFICANT CHANGE UP (ref 30–160)
IRON SATN MFR SERPL: 40 % — SIGNIFICANT CHANGE UP (ref 14–50)
LYMPHOCYTES # BLD AUTO: 2.23 K/UL — SIGNIFICANT CHANGE UP (ref 1–3.3)
LYMPHOCYTES # BLD AUTO: 20 % — SIGNIFICANT CHANGE UP (ref 13–44)
LYMPHOCYTES # BLD AUTO: 3.99 K/UL — HIGH (ref 1–3.3)
LYMPHOCYTES # BLD AUTO: 34.9 % — SIGNIFICANT CHANGE UP (ref 13–44)
MAGNESIUM SERPL-MCNC: 1.9 MG/DL — SIGNIFICANT CHANGE UP (ref 1.6–2.6)
MCHC RBC-ENTMCNC: 31 PG — SIGNIFICANT CHANGE UP (ref 27–34)
MCHC RBC-ENTMCNC: 32.3 PG — SIGNIFICANT CHANGE UP (ref 27–34)
MCHC RBC-ENTMCNC: 34.5 GM/DL — SIGNIFICANT CHANGE UP (ref 32–36)
MCHC RBC-ENTMCNC: 35 G/DL — SIGNIFICANT CHANGE UP (ref 32–36)
MCV RBC AUTO: 89.8 FL — SIGNIFICANT CHANGE UP (ref 80–100)
MCV RBC AUTO: 92.2 FL — SIGNIFICANT CHANGE UP (ref 80–100)
MONOCYTES # BLD AUTO: 0.74 K/UL — SIGNIFICANT CHANGE UP (ref 0–0.9)
MONOCYTES # BLD AUTO: 1.1 K/UL — HIGH (ref 0–0.9)
MONOCYTES NFR BLD AUTO: 6.6 % — SIGNIFICANT CHANGE UP (ref 2–14)
MONOCYTES NFR BLD AUTO: 9.6 % — SIGNIFICANT CHANGE UP (ref 2–14)
NEUTROPHILS # BLD AUTO: 6.13 K/UL — SIGNIFICANT CHANGE UP (ref 1.8–7.4)
NEUTROPHILS # BLD AUTO: 8.06 K/UL — HIGH (ref 1.8–7.4)
NEUTROPHILS NFR BLD AUTO: 53.8 % — SIGNIFICANT CHANGE UP (ref 43–77)
NEUTROPHILS NFR BLD AUTO: 72.3 % — SIGNIFICANT CHANGE UP (ref 43–77)
NRBC # BLD: 0 /100 WBCS — SIGNIFICANT CHANGE UP (ref 0–0)
NRBC # BLD: 0 /100 WBCS — SIGNIFICANT CHANGE UP (ref 0–0)
NRBC # FLD: 0.02 K/UL — HIGH (ref 0–0)
NT-PROBNP SERPL-SCNC: 247 PG/ML — HIGH (ref 0–125)
OB PNL STL: POSITIVE
PLATELET # BLD AUTO: 165 K/UL — SIGNIFICANT CHANGE UP (ref 150–400)
PLATELET # BLD AUTO: 174 K/UL — SIGNIFICANT CHANGE UP (ref 150–400)
POTASSIUM SERPL-MCNC: 3.9 MMOL/L — SIGNIFICANT CHANGE UP (ref 3.5–5.3)
POTASSIUM SERPL-SCNC: 3.9 MMOL/L — SIGNIFICANT CHANGE UP (ref 3.5–5.3)
PROT SERPL-MCNC: 6.5 GM/DL — SIGNIFICANT CHANGE UP (ref 6–8.3)
PROTHROM AB SERPL-ACNC: 12.3 SEC — SIGNIFICANT CHANGE UP (ref 9.5–13)
RBC # BLD: 2.13 M/UL — LOW (ref 3.8–5.2)
RBC # BLD: 2.17 M/UL — LOW (ref 3.8–5.2)
RBC # BLD: 2.55 M/UL — LOW (ref 3.8–5.2)
RBC # FLD: 13.9 % — SIGNIFICANT CHANGE UP (ref 10.3–14.5)
RBC # FLD: 14.2 % — SIGNIFICANT CHANGE UP (ref 10.3–14.5)
RETICS #: 88.8 K/UL — SIGNIFICANT CHANGE UP (ref 25–125)
RETICS/RBC NFR: 4.2 % — HIGH (ref 0.5–2.5)
SODIUM SERPL-SCNC: 141 MMOL/L — SIGNIFICANT CHANGE UP (ref 135–145)
TIBC SERPL-MCNC: 332 UG/DL — SIGNIFICANT CHANGE UP (ref 220–430)
TROPONIN I, HIGH SENSITIVITY RESULT: 48.7 NG/L — SIGNIFICANT CHANGE UP
TSH SERPL-MCNC: 2.59 UU/ML — SIGNIFICANT CHANGE UP (ref 0.36–3.74)
UIBC SERPL-MCNC: 200 UG/DL — SIGNIFICANT CHANGE UP (ref 110–370)
VIT B12 SERPL-MCNC: 312 PG/ML — SIGNIFICANT CHANGE UP (ref 232–1245)
WBC # BLD: 11.15 K/UL — HIGH (ref 3.8–10.5)
WBC # BLD: 11.43 K/UL — HIGH (ref 3.8–10.5)
WBC # FLD AUTO: 11.15 K/UL — HIGH (ref 3.8–10.5)
WBC # FLD AUTO: 11.43 K/UL — HIGH (ref 3.8–10.5)

## 2024-05-06 RX ORDER — METOPROLOL TARTRATE 50 MG
50 TABLET ORAL ONCE
Refills: 0 | Status: DISCONTINUED | OUTPATIENT
Start: 2024-05-06 | End: 2024-05-06

## 2024-05-06 RX ORDER — PANTOPRAZOLE SODIUM 20 MG/1
80 TABLET, DELAYED RELEASE ORAL ONCE
Refills: 0 | Status: COMPLETED | OUTPATIENT
Start: 2024-05-06 | End: 2024-05-06

## 2024-05-06 RX ORDER — SODIUM CHLORIDE 9 MG/ML
1000 INJECTION, SOLUTION INTRAVENOUS ONCE
Refills: 0 | Status: COMPLETED | OUTPATIENT
Start: 2024-05-06 | End: 2024-05-06

## 2024-05-06 RX ADMIN — PANTOPRAZOLE SODIUM 80 MILLIGRAM(S): 20 TABLET, DELAYED RELEASE ORAL at 11:00

## 2024-05-06 RX ADMIN — SODIUM CHLORIDE 1000 MILLILITER(S): 9 INJECTION, SOLUTION INTRAVENOUS at 09:59

## 2024-05-06 NOTE — ED PROVIDER NOTE - PHYSICAL EXAMINATION
Gen: aox3, nad,   Head: NCAT  ENT: Airway patent, moist mucous membranes, nasal passageways clear, no pharyngeal erythema or exudates, uvula midline, no cervical lymphadenopathy  Cardiac: elevated rate, normal rhythm, no murmurs   Respiratory: Lungs CTA B/L  Gastrointestinal: Abdomen soft, nontender, nondistended, no rebound, no guarding  MSK: No gross abnormalities, FROM of all four extremities, no edema  HEME: Extremities warm, pulses intact and symmetrical in all four extremities  Skin: No rashes, no lesions  Neuro: No gross neurologic deficits, CN II-XII intact, no facial asymmetry, no dysarthria, no dysmetria, no drift, strength equal in all four extremities, gait exam deferred due to dizziness Gen: aox3, nad,   Head: NCAT  ENT: Airway patent, moist mucous membranes, nasal passageways clear, no pharyngeal erythema or exudates, uvula midline, no cervical lymphadenopathy  Cardiac: elevated rate, normal rhythm, no murmurs   Respiratory: Lungs CTA B/L  Gastrointestinal: Abdomen soft, nontender, nondistended, no rebound, no guarding  rectal : chaperoned by JOLEEN Retana - melena, no bright red blood or hemorrhoids   MSK: No gross abnormalities, FROM of all four extremities, no edema  HEME: Extremities warm, pulses intact and symmetrical in all four extremities  Skin: No rashes, no lesions  Neuro: No gross neurologic deficits, CN II-XII intact, no facial asymmetry, no dysarthria, no dysmetria, no drift, strength equal in all four extremities, gait exam deferred due to dizziness

## 2024-05-06 NOTE — ED ADULT NURSE NOTE - OBJECTIVE STATEMENT
Patient received in 6A, A&Ox3 ambulatory at baseline transfer from [facility]: stents currently on Brilinta presenting to ED c/o dizziness and fatigue x1 day. Pt received PRBCs at [facility]. Arrived with left 20g intact. Denies CP, SOB, n/v/d, fever chills, abdominal pain, visual changes, urinary symptoms. Breathing even and unlabored. CBC sent

## 2024-05-06 NOTE — ED ADULT NURSE REASSESSMENT NOTE - NS ED NURSE REASSESS COMMENT FT1
no s/s of blood transfusion reaction at this time
patient educated on blood transfusion consent form signed  s/s of blood transfusion reaction patient on cardiac monitor
Assuming patient's care for coverage. Report received from RN and patient informed during rounding. Assessment available on KB. Will continue to monitor   Pt resting comfortably at this time. No s/s of pain noted. Verbalized understanding of plan of care. Confirmation T&C sent at this time as protocol. Awaiting blood bank to be ready for transfusion to start

## 2024-05-06 NOTE — ED ADULT NURSE NOTE - CHIEF COMPLAINT QUOTE
Pt presents as transfer from [facility] for low hemoglobin and interventional cardiology for recent cardiac stent placement currently on Brilinta. Pt endorses feeling dizzy/lightheaded since last week, denies shortness of breath, afebrile. Pt received one unit of PRBC's at other facility, denies any present pain.

## 2024-05-06 NOTE — ED ADULT NURSE NOTE - IS THE PATIENT ABLE TO BE SCREENED?
Medication:Omeprazole  Last prescribing provider:Dr. Campos  Last clinic visit date: 3/5/2024  Recommendations for requested medication:n/a  Any other pertinent information:pt has provider visit today routing to Dr. Campos     Yes

## 2024-05-06 NOTE — ED PROVIDER NOTE - PROGRESS NOTE DETAILS
SONYA Beaulieu PGY1- cardiology evaluated patient, recommended stopping brilinta for GI eval/scope, continuing baby ASA. note to follow. will admit

## 2024-05-06 NOTE — ED PROVIDER NOTE - ATTENDING CONTRIBUTION TO CARE
I performed a face-to-face evaluation of the patient and performed a history and physical examination. I agree with the history and physical examination. If this was a PA visit, I personally saw the patient with the PA and performed a substantive portion of the visit including all aspects of the medical decision making.    Anemia likely 2/2 GIB 2/2 DAPT. Check Hb. Call Cards. Notify GI. Admit.

## 2024-05-06 NOTE — CHART NOTE - NSCHARTNOTEFT_GEN_A_CORE
Patient presents with likely upper GI bleed and significant drop in Hb ( 5 points, current Hb 7, was 12 before). Patient with h/o CAD s/p PCI with STONEY x1 to LAD in 2018 and recent PCI in 03/19/2024 ( DESx 2 to mLAD for 80% long lesion, dissection distal edge of stent treated with STONEY, total 3 stents), patent stent pLAD, mild LCx disease. Discussed with GI Dr Calle and Cardiology oncall, recommended to transfer to [facility] given signigicant cardiac history with recent 3 stents to LAD, GI bleed with significant drop in Hb. Discussed with ED team Patient presents with likely upper GI bleed and significant drop in Hb ( 5 points, current Hb 7, was 12 before). Patient with h/o CAD s/p PCI with STONEY x1 to LAD in 2018 and recent PCI in 03/19/2024 ( DESx 2 to mLAD for 80% long lesion, dissection distal edge of stent treated with STONEY, total 3 stents), patent stent pLAD, mild LCx disease. Discussed with GI Dr Calle and Cardiology oncall, recommended to transfer to [facility] given signigicant cardiac history with recent 3 stents to LAD, GI bleed with significant drop in Hb. Discussed with ED team      Kelechi Rae MD FACP  Hospitalist

## 2024-05-06 NOTE — ED ADULT TRIAGE NOTE - CHIEF COMPLAINT QUOTE
Pt presents as transfer from [facility] for low hemoglobin and interventional cardiology for recent cardiac stent placement currently on Brilinta. Pt endorses feeling dizzy/lightheaded since last week, denies shortness of breath, afebrile. Pt received one unit of PRBC's at other facility, denies any present pain.

## 2024-05-06 NOTE — ED PROVIDER NOTE - OBJECTIVE STATEMENT
Karol: Cardiac stents 3/24. P/w dizziness and Hb 7.0. Transferred from [facility] b/c [facility] has interventional cardiology, and DAPT may need to be held. Dizzy. Got PRBCs at [facility].

## 2024-05-06 NOTE — ED ADULT NURSE NOTE - ED STAT RN HANDOFF DETAILS
Report endorsed to oncoming RN wanda  for my break coverage. Report given to covering RN and patient informed during rounding Safety checks compld this shift/Safety rounds completed hourly.  Fall +skin precs in place. Any issues endorsed to oncoming RN for follow up. RN Assumed patient's care for coverag

## 2024-05-06 NOTE — ED PROVIDER NOTE - PROGRESS NOTE DETAILS
Ruiz DO: d/w hospitalist Dr Rae, d/w Dr Calle and Dr Rae discussed with cardiologist on call, recommending transfer to interventional cardiology equipped hospital such as [facility] in case DAPT needs to be held in setting of recent stents

## 2024-05-06 NOTE — ED ADULT NURSE NOTE - NSFALLHARMRISKINTERV_ED_ALL_ED

## 2024-05-06 NOTE — ED PROVIDER NOTE - PHYSICAL EXAMINATION
General: no acute distress  Psych: mood appropriate  Head: normocephalic; atraumatic  Eyes: conjunctivae clear bilaterally, sclerae anicteric  ENT: no nasal flaring, patent nares  Cardio: mildly tachycardic  Resp: clear to auscultation bilaterally  GI: abdomen soft, nontender, nondistended  Neuro: A&oX3, eomi, perrl  MSK: normal movement of extremities

## 2024-05-06 NOTE — ED ADULT TRIAGE NOTE - CHIEF COMPLAINT QUOTE
" dizziness and increased heart beat started around 9pm last night" hx HTN, heart disease with stent  3/24"

## 2024-05-06 NOTE — ED PROVIDER NOTE - CLINICAL SUMMARY MEDICAL DECISION MAKING FREE TEXT BOX
64F pmhx HTN, HLD, CAD sp stents 3/19/24 on brillanta, presenting with dizziness described as light headedness worse with standing/ turning/  movements duration x 24 hours, intermittent over last few days but worsening this morning with heart racing sensation. Denies headache, numbness, weakness, chest pain or sob.  -found to have symptomatic anemia - with signs of upper GI bleed with melena, protonix, transfuse goal hgb > 8

## 2024-05-06 NOTE — ED PROVIDER NOTE - OBJECTIVE STATEMENT
64F pmhx HTN, HLD, CAD sp stents 3/19/24 on brillanta, presenting with dizziness described as light headedness worse with standing/ turning/  movements duration x 24 hours, intermittent over last few days but worsening this morning with heart racing sensation. Denies headache, numbness, weakness, chest pain or sob.

## 2024-05-06 NOTE — ED ADULT NURSE NOTE - OBJECTIVE STATEMENT
Received patient in bed M37a. c/O " dizziness and increased heart beat started around 9pm last night" hx HTN, heart disease with stent  3/24"  cardiac monitor in place

## 2024-05-07 DIAGNOSIS — D64.9 ANEMIA, UNSPECIFIED: ICD-10-CM

## 2024-05-07 DIAGNOSIS — I10 ESSENTIAL (PRIMARY) HYPERTENSION: ICD-10-CM

## 2024-05-07 DIAGNOSIS — Z29.9 ENCOUNTER FOR PROPHYLACTIC MEASURES, UNSPECIFIED: ICD-10-CM

## 2024-05-07 DIAGNOSIS — I25.10 ATHEROSCLEROTIC HEART DISEASE OF NATIVE CORONARY ARTERY WITHOUT ANGINA PECTORIS: ICD-10-CM

## 2024-05-07 DIAGNOSIS — D64.89 OTHER SPECIFIED ANEMIAS: ICD-10-CM

## 2024-05-07 LAB
ALBUMIN SERPL ELPH-MCNC: 3.4 G/DL — SIGNIFICANT CHANGE UP (ref 3.3–5)
ALP SERPL-CCNC: 68 U/L — SIGNIFICANT CHANGE UP (ref 40–120)
ALT FLD-CCNC: 12 U/L — SIGNIFICANT CHANGE UP (ref 4–33)
ANION GAP SERPL CALC-SCNC: 12 MMOL/L — SIGNIFICANT CHANGE UP (ref 7–14)
AST SERPL-CCNC: 23 U/L — SIGNIFICANT CHANGE UP (ref 4–32)
BILIRUB SERPL-MCNC: 1.2 MG/DL — SIGNIFICANT CHANGE UP (ref 0.2–1.2)
BLD GP AB SCN SERPL QL: NEGATIVE — SIGNIFICANT CHANGE UP
BUN SERPL-MCNC: 14 MG/DL — SIGNIFICANT CHANGE UP (ref 7–23)
CALCIUM SERPL-MCNC: 8.6 MG/DL — SIGNIFICANT CHANGE UP (ref 8.4–10.5)
CHLORIDE SERPL-SCNC: 110 MMOL/L — HIGH (ref 98–107)
CK MB CFR SERPL CALC: 2 NG/ML — SIGNIFICANT CHANGE UP
CO2 SERPL-SCNC: 19 MMOL/L — LOW (ref 22–31)
CREAT SERPL-MCNC: 0.62 MG/DL — SIGNIFICANT CHANGE UP (ref 0.5–1.3)
EGFR: 99 ML/MIN/1.73M2 — SIGNIFICANT CHANGE UP
GLUCOSE SERPL-MCNC: 90 MG/DL — SIGNIFICANT CHANGE UP (ref 70–99)
NT-PROBNP SERPL-SCNC: 291 PG/ML — SIGNIFICANT CHANGE UP
POTASSIUM SERPL-MCNC: 3.7 MMOL/L — SIGNIFICANT CHANGE UP (ref 3.5–5.3)
POTASSIUM SERPL-SCNC: 3.7 MMOL/L — SIGNIFICANT CHANGE UP (ref 3.5–5.3)
PROT SERPL-MCNC: 6 G/DL — SIGNIFICANT CHANGE UP (ref 6–8.3)
RH IG SCN BLD-IMP: POSITIVE — SIGNIFICANT CHANGE UP
SODIUM SERPL-SCNC: 141 MMOL/L — SIGNIFICANT CHANGE UP (ref 135–145)
TROPONIN T, HIGH SENSITIVITY RESULT: 19 NG/L — SIGNIFICANT CHANGE UP

## 2024-05-07 RX ORDER — ACETAMINOPHEN 500 MG
650 TABLET ORAL EVERY 6 HOURS
Refills: 0 | Status: DISCONTINUED | OUTPATIENT
Start: 2024-05-07 | End: 2024-05-10

## 2024-05-07 RX ORDER — TICAGRELOR 90 MG/1
90 TABLET ORAL EVERY 12 HOURS
Refills: 0 | Status: DISCONTINUED | OUTPATIENT
Start: 2024-05-07 | End: 2024-05-10

## 2024-05-07 RX ORDER — SOD SULF/SODIUM/NAHCO3/KCL/PEG
4000 SOLUTION, RECONSTITUTED, ORAL ORAL ONCE
Refills: 0 | Status: COMPLETED | OUTPATIENT
Start: 2024-05-07 | End: 2024-05-07

## 2024-05-07 RX ORDER — ONDANSETRON 8 MG/1
4 TABLET, FILM COATED ORAL EVERY 8 HOURS
Refills: 0 | Status: DISCONTINUED | OUTPATIENT
Start: 2024-05-07 | End: 2024-05-07

## 2024-05-07 RX ORDER — PANTOPRAZOLE SODIUM 20 MG/1
40 TABLET, DELAYED RELEASE ORAL EVERY 12 HOURS
Refills: 0 | Status: DISCONTINUED | OUTPATIENT
Start: 2024-05-07 | End: 2024-05-08

## 2024-05-07 RX ORDER — LOSARTAN POTASSIUM 100 MG/1
100 TABLET, FILM COATED ORAL DAILY
Refills: 0 | Status: DISCONTINUED | OUTPATIENT
Start: 2024-05-07 | End: 2024-05-10

## 2024-05-07 RX ORDER — ASPIRIN/CALCIUM CARB/MAGNESIUM 324 MG
81 TABLET ORAL DAILY
Refills: 0 | Status: DISCONTINUED | OUTPATIENT
Start: 2024-05-07 | End: 2024-05-10

## 2024-05-07 RX ORDER — LANOLIN ALCOHOL/MO/W.PET/CERES
3 CREAM (GRAM) TOPICAL AT BEDTIME
Refills: 0 | Status: DISCONTINUED | OUTPATIENT
Start: 2024-05-07 | End: 2024-05-07

## 2024-05-07 RX ORDER — ATORVASTATIN CALCIUM 80 MG/1
80 TABLET, FILM COATED ORAL AT BEDTIME
Refills: 0 | Status: DISCONTINUED | OUTPATIENT
Start: 2024-05-07 | End: 2024-05-10

## 2024-05-07 RX ORDER — METOPROLOL TARTRATE 50 MG
50 TABLET ORAL DAILY
Refills: 0 | Status: DISCONTINUED | OUTPATIENT
Start: 2024-05-07 | End: 2024-05-10

## 2024-05-07 RX ADMIN — PANTOPRAZOLE SODIUM 40 MILLIGRAM(S): 20 TABLET, DELAYED RELEASE ORAL at 18:44

## 2024-05-07 RX ADMIN — Medication 81 MILLIGRAM(S): at 11:37

## 2024-05-07 RX ADMIN — TICAGRELOR 90 MILLIGRAM(S): 90 TABLET ORAL at 13:45

## 2024-05-07 RX ADMIN — ATORVASTATIN CALCIUM 80 MILLIGRAM(S): 80 TABLET, FILM COATED ORAL at 21:06

## 2024-05-07 RX ADMIN — PANTOPRAZOLE SODIUM 40 MILLIGRAM(S): 20 TABLET, DELAYED RELEASE ORAL at 08:35

## 2024-05-07 RX ADMIN — Medication 4000 MILLILITER(S): at 18:18

## 2024-05-07 NOTE — H&P ADULT - PROBLEM SELECTOR PLAN 1
Anemia to 7 at OSH s/p 1u pRBCs. Baseline Hb 12 last month. No obvious source of bleed at this point.  - GI consulted. appreciate recs  - monitor CBC daily  - transfuse as needed for Hb >8

## 2024-05-07 NOTE — CONSULT NOTE ADULT - ASSESSMENT
Patient is a 64 year old female with PMHx CAD s/p DESx1 mLAD with IV Lithotripsy and balloon angioplasty 3/19/24 at [facility] with Dr. Nowak on DAPT, HTN on Losartan HCTZ who presents as transfer from [facility] for ongoing workup of symptomatic anemia and in setting of concern for DAPT and CAD history. Cardiology called overnight for assistance in DAPT management. On interview, patient appears NAD and stable at present with HR and BP stable, demonstrating appropriate response to PRBC from [facility] admission. EKG unchanged and nonischemic compared to prior. Cardiac enzymes negative. Clinical presentation at OSH in association with significant Hgb drop concerning for symptomatic anemia, and very low suspicion for ACS in setting of negative cardiac enzymes and nonischemic EKG.    PLAN:  #Symptomatic Anemia  #H/O CAD s/p PCI 3/2024 on DAPT  - Recommend admission to telemetry, continue monitoring for arrhythmia  - [facility], Troponin T here at [facility] negative at 19, CK-MB 2, proBNP 291  - Consider obtaining additional set of troponin/CK/CKMB/CK to ensure continuation of downtrend in 8 hours if patient with concern for active chest pain   - EKGs PRN for chest pain  - Replete lytes to maintain K>4 and Mg>2  - Would not treat as ACS  - Recommend discontinuation of home Brilinta 90 mg BID for now in setting of UGIB workup, continue ASA 81 mg given recent LHC with PCI within past 6 months  - Appreciate GI consultation and recommendations regarding workup of symptomatic anemia  - Can consider obtaining TTE in AM for further assessment of LV function and rule out WMA as no prior TTE noted in [place], however doubt it will change course in managing patient symptoms and workup for GIB  - Trend H/H to ensure no significant drop   - Can continue home Lipitor, ARB, BB for now  - If WBC with significant uptrend and/or patient febrile, can consider initiation of infectious workup per primary team  - Continue symptomatic management per primary medicine team        Case discussed with cardiology fellow Judie Arrington MD  Should patient HD status change, please call cardiology at #[phone] once again.  Note Incomplete, Attending attestation to follow for final recommendations

## 2024-05-07 NOTE — H&P ADULT - ATTENDING COMMENTS
Pt feels tired, easily fatigued, but without localizing symptoms. No evidence of active bleeding. Appreciate GI consult. Anticipate possible EGD/colonoscopy tomorrow. Currently on aspirin; will discuss resuming brillinta with GI and Cardiology given recent LIN to LAD.

## 2024-05-07 NOTE — CONSULT NOTE ADULT - SUBJECTIVE AND OBJECTIVE BOX
CHIEF COMPLAINT:    HISTORY OF PRESENT ILLNESS:      Allergies    No Known Allergies    Intolerances    	    MEDICATIONS:                  PAST MEDICAL & SURGICAL HISTORY:      FAMILY HISTORY:      SOCIAL HISTORY:    [ ] Non-smoker  [ ] Smoker  [ ] Alcohol  [ ] Denies Alcohol      REVIEW OF SYSTEMS:  CONSTITUTIONAL: no fevers or chills  EYES/ENT: No visual changes; No vertigo or throat pain  NECK: No JVD  RESPIRATORY:  No cough, wheezing, chills or hemoptysis, SOB  CARDIOVASCULAR: No chest pain, palpitations, passing out, dizziness, or leg swelling  GASTROINTESTINAL: No abdominal or epigastric pain. No nausea/vomiting/melena  Genitourinary: No dysuria, frequency or hematuria  NEUROLOGICAL: No numbness or weakness  SKIN: No itching, burning, rashes or lesions      PHYSICAL EXAM:  T(C): 36.8 (05-07-24 @ 00:42), Max: 37.3 (05-06-24 @ 18:39)  HR: 83 (05-07-24 @ 00:42) (83 - 111)  BP: 128/74 (05-07-24 @ 00:42) (128/74 - 152/85)  RR: 18 (05-07-24 @ 00:42) (16 - 18)  SpO2: 100% (05-07-24 @ 00:42) (96% - 100%)  Wt(kg): --  ICU Vital Signs Last 24 Hrs  T(C): 36.8 (07 May 2024 00:42), Max: 37.3 (06 May 2024 18:39)  T(F): 98.2 (07 May 2024 00:42), Max: 99.1 (06 May 2024 18:39)  HR: 83 (07 May 2024 00:42) (83 - 111)  BP: 128/74 (07 May 2024 00:42) (128/74 - 152/85)  BP(mean): --  ABP: --  ABP(mean): --  RR: 18 (07 May 2024 00:42) (16 - 18)  SpO2: 100% (07 May 2024 00:42) (96% - 100%)    O2 Parameters below as of 07 May 2024 00:42  Patient On (Oxygen Delivery Method): room air          I&O's Summary        Appearance: Normal	  HEENT:   Normal oral mucosa	  Cardiovascular: Normal S1 S2, No JVD, No murmurs, No edema  Respiratory: Lungs clear to auscultation	  Psychiatry: A & O x 3, Mood & affect appropriate  Gastrointestinal:  Soft, Non-tender, + BS	  Skin: No rashes, No ecchymoses, No cyanosis	  Neurologic: Non-focal  Extremities: Warm and well perfused. 2+ pulses bilaterally        LABS:	 	    CBC Full  -  ( 06 May 2024 21:27 )  WBC Count : 11.43 K/uL  Hemoglobin : 7.9 g/dL  Hematocrit : 22.9 %  Platelet Count - Automated : 165 K/uL  Mean Cell Volume : 89.8 fL  Mean Cell Hemoglobin : 31.0 pg  Mean Cell Hemoglobin Concentration : 34.5 gm/dL  Auto Neutrophil # : 6.13 K/uL  Auto Lymphocyte # : 3.99 K/uL  Auto Monocyte # : 1.10 K/uL  Auto Eosinophil # : 0.13 K/uL  Auto Basophil # : 0.04 K/uL  Auto Neutrophil % : 53.8 %  Auto Lymphocyte % : 34.9 %  Auto Monocyte % : 9.6 %  Auto Eosinophil % : 1.1 %  Auto Basophil % : 0.3 %    05-07    141  |  110<H>  |  14  ----------------------------<  90  3.7   |  19<L>  |  0.62    Ca    8.6      07 May 2024 00:29    TPro  6.0  /  Alb  3.4  /  TBili  1.2  /  DBili  x   /  AST  23  /  ALT  12  /  AlkPhos  68  05-07      proBNP:   Lipid Profile:   HgA1c:   TSH:     CARDIAC MARKERS:                TELEMETRY: 	    ECG:  	    PREVIOUS DIAGNOSTIC TESTING:    [ ] Echocardiogram:  [ ]  Catheterization:  [ ] Stress Test:  	   CHIEF COMPLAINT: Dizziness/Symptomatic Anemia    HISTORY OF PRESENT ILLNESS:  Patient is a 64 year old female with PMHx CAD s/p DESx1 mLAD with IV Lithotripsy and balloon angioplasty 3/19/24 at [facility] with Dr. Nowak on DAPT, HTN on Losartan HCTZ who presents as transfer from [facility] for ongoing workup of symptomatic anemia and in setting of concern for DAPT and CAD history. Cardiology called overnight for assistance in DAPT management. On interview, patient denies active symptoms including lightheadedness/dizziness, chest pain, SOB. Patient reports chief complaint as 1 day onset of dizziness, no other symptoms noted. Patient reports having followed up with Dr. Nowak after stent placement and endorses compliance with medications prescribed for medical management of ACS. Denies other cardiac history other than stent and HTN, denies smoking/ETOH/illcit substance use. Denies recent history of hematemesis/coffee ground emesis/melena prior to hospital arrival. Patient denies history of GIB.    [facility] COURSE:  Hgb baseline ~12, noted to have Hgb 7 at [place], s/p 1U PRBC there, after blood product resuscitation Hgb 7.9 here at [facility]    Troponin I negative at 48.7    [facility] HOSPITAL COURSE:  Has not received medications  VS HR 80s-90s, SBP 120s-150s, >95% on room air, afebrile oral temp  EKG: SR, nonischemic  Labs: WBC 11.43, neutrophil count wnl, H/H 7.9/22.9 with baseline Hgb ~12-13, K 3.7, SCr 0.62, Troponin T 19, -> 286, CKMB 2, proBNP 291            Allergies  No Known Allergies    	    HOME MEDICATIONS:  ASA 81 mg  Brilinta 90 mg BID  Toprol 50 mg  Losartan HCTZ 100-25 mg  Lipitor 80 mg                PAST MEDICAL & SURGICAL HISTORY: LHC 3/19/24 at [facility]      FAMILY HISTORY:      SOCIAL HISTORY:    [ x ] Non-smoker  [ ] Smoker  [ ] Alcohol  [ x ] Denies Alcohol      REVIEW OF SYSTEMS:  CONSTITUTIONAL: +Dizziness no fevers or chills  EYES/ENT: No visual changes; No vertigo or throat pain  NECK: No JVD  RESPIRATORY:  No cough, wheezing, chills or hemoptysis, SOB  CARDIOVASCULAR: No chest pain, palpitations, passing out, dizziness, or leg swelling  GASTROINTESTINAL: No abdominal or epigastric pain. No nausea/vomiting/melena  Genitourinary: No dysuria, frequency or hematuria  NEUROLOGICAL: No numbness or weakness  SKIN: No itching, burning, rashes or lesions      PHYSICAL EXAM:  T(C): 36.8 (05-07-24 @ 00:42), Max: 37.3 (05-06-24 @ 18:39)  HR: 83 (05-07-24 @ 00:42) (83 - 111)  BP: 128/74 (05-07-24 @ 00:42) (128/74 - 152/85)  RR: 18 (05-07-24 @ 00:42) (16 - 18)  SpO2: 100% (05-07-24 @ 00:42) (96% - 100%)  Wt(kg): --  ICU Vital Signs Last 24 Hrs  T(C): 36.8 (07 May 2024 00:42), Max: 37.3 (06 May 2024 18:39)  T(F): 98.2 (07 May 2024 00:42), Max: 99.1 (06 May 2024 18:39)  HR: 83 (07 May 2024 00:42) (83 - 111)  BP: 128/74 (07 May 2024 00:42) (128/74 - 152/85)  RR: 18 (07 May 2024 00:42) (16 - 18)  SpO2: 100% (07 May 2024 00:42) (96% - 100%)    O2 Parameters below as of 07 May 2024 00:42  Patient On (Oxygen Delivery Method): room air          I&O's Summary        PHYSICAL EXAM:  Appearance: Normal	  HEENT:   Normal oral mucosa	  Cardiovascular: Normal S1 S2, No JVD, No murmurs, No edema  Respiratory: Lungs clear to auscultation	  Psychiatry: A & O x 3, Mood & affect appropriate  Gastrointestinal:  Soft, Non-tender, + BS	  Skin: No rashes, No ecchymoses, No cyanosis	  Neurologic: Non-focal  Extremities: Warm and well perfused. 2+ pulses bilaterally        LABS:	 	  CBC Full  -  ( 06 May 2024 21:27 )  WBC Count : 11.43 K/uL  Hemoglobin : 7.9 g/dL  Hematocrit : 22.9 %  Platelet Count - Automated : 165 K/uL  Mean Cell Volume : 89.8 fL  Mean Cell Hemoglobin : 31.0 pg  Mean Cell Hemoglobin Concentration : 34.5 gm/dL  Auto Neutrophil # : 6.13 K/uL  Auto Lymphocyte # : 3.99 K/uL  Auto Monocyte # : 1.10 K/uL  Auto Eosinophil # : 0.13 K/uL  Auto Basophil # : 0.04 K/uL  Auto Neutrophil % : 53.8 %  Auto Lymphocyte % : 34.9 %  Auto Monocyte % : 9.6 %  Auto Eosinophil % : 1.1 %  Auto Basophil % : 0.3 %    05-07    141  |  110<H>  |  14  ----------------------------<  90  3.7   |  19<L>  |  0.62    Ca    8.6      07 May 2024 00:29    TPro  6.0  /  Alb  3.4  /  TBili  1.2  /  DBili  x   /  AST  23  /  ALT  12  /  AlkPhos  68  05-07      proBNP: 291  Lipid Profile:   HgA1c:   TSH:     CARDIAC MARKERS:  Troponin 19                  PREVIOUS DIAGNOSTIC TESTING:    [ ] Echocardiogram:  [ x ]  Catheterization:  Cardiac Catheterization (03.19.24 @ 10:56)  Conclusions:     - There is 1-vessel coronary artery disease         - Successful IVUS-guided Intervention of mid LAD 80% calcified    stenosis with a 3.0 Shockwave IVL, a 3.0 x 26 mm Luis M, a 2.5    x 22 mm Luis M,a 2.25 x 8 mm Luis M LIN, post dilated with a 2.5 NC    balloon distally. 0% Residual stenosis post intervention    with excellent angiographic result and RAMA 3 flow.     IVUS(Post): MSA > 4.0 mm2 distally and > 6 mm2 in the proximal,    Excellent stent apposition and expansion without distal    edge dissection       [ ] Stress Test:

## 2024-05-07 NOTE — H&P ADULT - NSHPLABSRESULTS_GEN_ALL_CORE
.  LABS:                         7.9    11.43 )-----------( 165      ( 06 May 2024 21:27 )             22.9     05-07    141  |  110<H>  |  14  ----------------------------<  90  3.7   |  19<L>  |  0.62    Ca    8.6      07 May 2024 00:29    TPro  6.0  /  Alb  3.4  /  TBili  1.2  /  DBili  x   /  AST  23  /  ALT  12  /  AlkPhos  68  05-07      Urinalysis Basic - ( 07 May 2024 00:29 )    Color: x / Appearance: x / SG: x / pH: x  Gluc: 90 mg/dL / Ketone: x  / Bili: x / Urobili: x   Blood: x / Protein: x / Nitrite: x   Leuk Esterase: x / RBC: x / WBC x   Sq Epi: x / Non Sq Epi: x / Bacteria: x            RADIOLOGY, EKG & ADDITIONAL TESTS: Reviewed.

## 2024-05-07 NOTE — CONSULT NOTE ADULT - SUBJECTIVE AND OBJECTIVE BOX
Chief Complaint:  Patient is a 64y old  Female who presents with a chief complaint of     HPI:  64F with hx of CAD s/p DESx1 (3/19/24) at [facility] on DAPT presents as transfer from [facility] for ongoing workup of symptomatic anemia and in setting of concern for DAPT and CAD history. Patient denies active symptoms including lightheadedness/dizziness, chest pain, SOB. Patient reports chief complaint as 1 day onset of dizziness, no other symptoms noted. Patient reports having followed up with Dr. Nowak after stent placement and endorses compliance with medications prescribed for medical management of ACS. Denies recent history of hematemesis/coffee ground emesis/melena prior to hospital arrival; no abd pain, nausea/vomiting, dysphagia, odynophagia. Subjective weight loss; but unable to quantify amount. EGD 10+ years ago normal; no previous colonoscopy.    [facility] COURSE:  Hgb baseline ~12, noted to have Hgb 7 at [place], s/p 1U PRBC there, after blood product resuscitation Hgb 7.9 here at [facility]  Troponin I negative at 48.7    Allergies:  No Known Allergies      Home Medications:    Hospital Medications:  acetaminophen     Tablet .. 650 milliGRAM(s) Oral every 6 hours PRN  aspirin enteric coated 81 milliGRAM(s) Oral daily  atorvastatin 80 milliGRAM(s) Oral at bedtime  hydrochlorothiazide 25 milliGRAM(s) Oral daily  losartan 100 milliGRAM(s) Oral daily  metoprolol succinate ER 50 milliGRAM(s) Oral daily  pantoprazole  Injectable 40 milliGRAM(s) IV Push every 12 hours  ticagrelor 90 milliGRAM(s) Oral every 12 hours      PMHX/PSHX:  CAD (coronary artery disease)    HTN (hypertension)    Family history:      Denies family history of colon cancer/polyps, stomach cancer/polyps, pancreatic cancer/masses, liver cancer/disease, ovarian cancer and endometrial cancer.    Social History:     Tob: Denies  EtOH: Denies  Illicit Drugs: Denies    ROS:     General:  No wt loss, fevers, chills  ENT:  No dysphagia  CV:  No pain, palpitations  Pulm:  No dyspnea, cough  GI:  No pain, No nausea, No vomiting, No diarrhea, No constipation, No rectal bleeding, No tarry stools, No dysphagia,  Muscle:  No pain, weakness  Neuro:  No weakness  Heme:  No ecchymosis  Skin:  No rash    PHYSICAL EXAM:     GENERAL:  No acute distress  HEENT:  no scleral icterus  CHEST:  no accessory muscle use  HEART:  Regular rate and rhythm  ABDOMEN:  Soft, non-tender, non-distended  RECTAL (chaperoned by RN Reyes-Torres, Dianna): dark green stools  EXTREMITIES: No edema  SKIN:  No rash/ecchymoses  NEURO:  Alert and oriented x 3    Vital Signs:  Vital Signs Last 24 Hrs  T(C): 36.7 (07 May 2024 05:53), Max: 37.3 (06 May 2024 18:39)  T(F): 98.1 (07 May 2024 05:53), Max: 99.1 (06 May 2024 18:39)  HR: 90 (07 May 2024 05:53) (83 - 111)  BP: 124/85 (07 May 2024 05:53) (117/72 - 152/85)  BP(mean): --  RR: 17 (07 May 2024 05:53) (16 - 18)  SpO2: 100% (07 May 2024 05:53) (96% - 100%)    Parameters below as of 07 May 2024 05:53  Patient On (Oxygen Delivery Method): room air      Daily Height in cm: 157.5 (07 May 2024 05:53)    Daily     LABS:                        7.9    11.43 )-----------( 165      ( 06 May 2024 21:27 )             22.9     Mean Cell Volume: 89.8 fL (05-06-24 @ 21:27)    05-07    141  |  110<H>  |  14  ----------------------------<  90  3.7   |  19<L>  |  0.62    Ca    8.6      07 May 2024 00:29    TPro  6.0  /  Alb  3.4  /  TBili  1.2  /  DBili  x   /  AST  23  /  ALT  12  /  AlkPhos  68  05-07    LIVER FUNCTIONS - ( 07 May 2024 00:29 )  Alb: 3.4 g/dL / Pro: 6.0 g/dL / ALK PHOS: 68 U/L / ALT: 12 U/L / AST: 23 U/L / GGT: x             Urinalysis Basic - ( 07 May 2024 00:29 )    Color: x / Appearance: x / SG: x / pH: x  Gluc: 90 mg/dL / Ketone: x  / Bili: x / Urobili: x   Blood: x / Protein: x / Nitrite: x   Leuk Esterase: x / RBC: x / WBC x   Sq Epi: x / Non Sq Epi: x / Bacteria: x                              7.9    11.43 )-----------( 165      ( 06 May 2024 21:27 )             22.9

## 2024-05-07 NOTE — H&P ADULT - ASSESSMENT
Patient is a 64 year old female with PMHx CAD s/p DESx1 mLAD with IV Lithotripsy and balloon angioplasty 3/19/24 at [facility] with Dr. Nowak on DAPT, HTN on Losartan HCTZ who presents as transfer from [facility] for ongoing workup of symptomatic anemia and in setting of concern for DAPT and CAD history.

## 2024-05-07 NOTE — ED ADULT NURSE REASSESSMENT NOTE - NS ED NURSE REASSESS COMMENT FT1
Patient resting comfortably in stretcher, breathing even and unlabored. Offers no complaints at this time. Instructed to call for assistance. Stretcher in lowest position, wheels locked, appropriate side rails in place, call bell in reach. Pending cardiology consult.

## 2024-05-07 NOTE — H&P ADULT - PROBLEM SELECTOR PLAN 2
s/p DESx1 mLAD with IV Lithotripsy and balloon angioplasty 3/19/24 at [facility] with Dr. Nowak on DAPT    - cards consulted, appreciate recs  - c/w aspirin, hold brilinta for now i/s/o anemia workup

## 2024-05-07 NOTE — CONSULT NOTE ADULT - ASSESSMENT
64F with hx of CAD s/p DESx1 (3/19/24) at [facility] on DAPT presents as transfer from [facility] for symptomatic anemia    Impression  #symptomatic anemia without overt GI bleeding  #subjective weight loss; but unable to quantify amount or time frame  #CAD s/p stent 3/19/24; on ASA/Brilinta  - Hgb baseline 12; on presentation to [facility] 7 s/p 1U pRBC with improvement to 7.9  - no abd pain, nausea/vomiting, dysphagia, odynophagia, hematemesis/coffee ground emesis/melena  - EGD 10+ years ago normal; no previous colonoscopy  - no significant NSAID hx    Recommendations  - CLD today; NPO after midnight  - plan for EGD/colonoscopy tentatively tomorrow if pt/family amenable (not yet decided)  - no GI contraindication to continuing DAPT therapy in setting of recent cardiac stent; risk of holding outweigh benefits  - monitor BM; trend H/H and transfuse as needed    Note and recommendations are incomplete until reviewed and attested by attending.    Mikayla Jernigan MD  GI/Hepatology Fellow, PGY4  [place] Pager [phone] or [facility] Pager [phone]  Teams preferred (7AM to 5PM); after 5PM, call GI fellow on call    On Weekends/Holidays (All Day) and Weekdays after 5PM to 8AM  For non-urgent consults, please email [email or web address] and [email or web address]  For urgent consults, please contact on call GI team. See Amion schedule ([facility]), [employer] paging system ([facility]), or call hospital  ([facility]) 64F with hx of CAD s/p DESx1 (3/19/24) at [facility] on DAPT presents as transfer from [facility] for symptomatic anemia    Impression  #symptomatic anemia without overt GI bleeding  #subjective weight loss; but unable to quantify amount or time frame  #CAD s/p stent 3/19/24; on ASA/Brilinta  - Hgb baseline 12; on presentation to [facility] 7 s/p 1U pRBC with improvement to 7.9  - no abd pain, nausea/vomiting, dysphagia, odynophagia, hematemesis/coffee ground emesis/melena  - EGD 10+ years ago normal; no previous colonoscopy  - no significant NSAID hx    Recommendations  - CLD today; NPO after midnight  - plan for EGD/colonoscopy tentatively tomorrow pending cardiac optimization and if pt/family amenable (not yet decided)  - cardiology optimization  - no GI contraindication to continuing DAPT therapy in setting of recent cardiac stent; risk of holding outweigh benefits  - monitor BM; trend H/H and transfuse as needed    Note and recommendations are incomplete until reviewed and attested by attending.    Mikayla Jernigan MD  GI/Hepatology Fellow, PGY4  [place] Pager [phone] or [facility] Pager [phone]  Teams preferred (7AM to 5PM); after 5PM, call GI fellow on call    On Weekends/Holidays (All Day) and Weekdays after 5PM to 8AM  For non-urgent consults, please email [email or web address] and [email or web address]  For urgent consults, please contact on call GI team. See Amion schedule ([facility]), [employer] paging system ([facility]), or call hospital  ([facility])

## 2024-05-07 NOTE — PATIENT PROFILE ADULT - FALL HARM RISK - HARM RISK INTERVENTIONS
Assistance with ambulation/Assistance OOB with selected safe patient handling equipment/Communicate Risk of Fall with Harm to all staff/Discuss with provider need for PT consult/Monitor gait and stability/Provide patient with walking aids - walker, cane, crutches/Reinforce activity limits and safety measures with patient and family/Sit up slowly, dangle for a short time, stand at bedside before walking/Tailored Fall Risk Interventions/Use of alarms - bed, chair and/or voice tab/Visual Cue: Yellow wristband and red socks/Bed in lowest position, wheels locked, appropriate side rails in place/Call bell, personal items and telephone in reach/Instruct patient to call for assistance before getting out of bed or chair/Non-slip footwear when patient is out of bed/[place] to call system/Physically safe environment - no spills, clutter or unnecessary equipment/Purposeful Proactive Rounding/Room/bathroom lighting operational, light cord in reach

## 2024-05-07 NOTE — H&P ADULT - NSHPPHYSICALEXAM_GEN_ALL_CORE
Vital Signs Last 24 Hrs  T(C): 36.7 (07 May 2024 05:53), Max: 37.3 (06 May 2024 18:39)  T(F): 98.1 (07 May 2024 05:53), Max: 99.1 (06 May 2024 18:39)  HR: 90 (07 May 2024 05:53) (83 - 111)  BP: 124/85 (07 May 2024 05:53) (117/72 - 152/85)  BP(mean): --  RR: 17 (07 May 2024 05:53) (16 - 18)  SpO2: 100% (07 May 2024 05:53) (96% - 100%)    Parameters below as of 07 May 2024 05:53  Patient On (Oxygen Delivery Method): room air    PHYSICAL EXAM:  GENERAL: NAD, Resting in bed  HEENT:  Head atraumatic, EOMI, PERRLA, conjunctiva and sclera clear; Moist mucous membranes, normal oropharynx  NECK: Supple, No JVD, no lymphadenopathy, no thyroid nodules or enlargement  CHEST/LUNG: Clear to auscultation bilaterally; No rales, rhonchi, wheezing, or rubs. Unlabored respirations on room air  HEART: Regular rate and rhythm; No murmurs, rubs, or gallops  ABDOMEN: Bowel sounds present; Soft, Nontender, Nondistended. No hepatomegally  EXTREMITIES:  2+ Peripheral Pulses, brisk capillary refill. No clubbing, cyanosis, or edema  NERVOUS SYSTEM:  Alert & Oriented X3, non-focal and spontaneous movements of all extremities  SKIN: No rashes or lesions

## 2024-05-07 NOTE — ED ADULT NURSE REASSESSMENT NOTE - NS ED NURSE REASSESS COMMENT FT1
Pt resting comfortably in stretcher, breathing even and unlabored. Offers no complaints at this time. Instructed to call for assistance. Stretcher in lowest position, wheels locked, appropriate side rails in place, call bell in reach. Pending admitted bed assignment

## 2024-05-07 NOTE — H&P ADULT - HISTORY OF PRESENT ILLNESS
Patient is a 64 year old female with PMHx CAD s/p DESx1 mLAD with IV Lithotripsy and balloon angioplasty 3/19/24 at [facility] with Dr. Nowak on DAPT, HTN on Losartan HCTZ who presents as transfer from [facility] for ongoing workup of symptomatic anemia and in setting of concern for DAPT and CAD history. Cardiology called overnight for assistance in DAPT management. On interview, patient denies active symptoms including lightheadedness/dizziness, chest pain, SOB. Patient reports chief complaint as 1 day onset of dizziness, no other symptoms noted. Patient reports having followed up with Dr. Nowak after stent placement and endorses compliance with medications prescribed for medical management of ACS. Denies other cardiac history other than stent and HTN, denies smoking/ETOH/illcit substance use. Denies recent history of hematemesis/coffee ground emesis/melena prior to hospital arrival. Patient denies history of GIB.    [facility] COURSE:  Hgb baseline ~12, noted to have Hgb 7 at [place], s/p 1U PRBC there, after blood product resuscitation Hgb 7.9 here at [facility]    Troponin I negative at 48.7    [facility] HOSPITAL COURSE:  Has not received medications  VS HR 80s-90s, SBP 120s-150s, >95% on room air, afebrile oral temp  EKG: SR, nonischemic  Labs: WBC 11.43, neutrophil count wnl, H/H 7.9/22.9 with baseline Hgb ~12-13, K 3.7, SCr 0.62, Troponin T 19, -> 286, CKMB 2, proBNP 291

## 2024-05-07 NOTE — CONSULT NOTE ADULT - NS ATTEND AMEND GEN_ALL_CORE FT
cont asa 81mg daily, hold ticagrelor in setting of acute anemia  no cardiac contraindication to anemia eval as this is necessary to safely continue antiplatelet therapy

## 2024-05-07 NOTE — CONSULT NOTE ADULT - ATTENDING COMMENTS
64F with hx LIN March 2024 on ASA and Brilinta presenting with new anemia to 7, responded appropriately to 1 unit PRBC. No GI sx, but subjective weight loss. EGD 10+ years ago; no prior colon. Would obtain iron studies, ferritin on pre transfusion labs. No GI contraindication to continuation of DAPT. Pending cards approval, will plan for colonoscopy +/- EGD tomorrow.

## 2024-05-08 LAB
A1C WITH ESTIMATED AVERAGE GLUCOSE RESULT: 5 % — SIGNIFICANT CHANGE UP (ref 4–5.6)
ADD ON TEST-SPECIMEN IN LAB: SIGNIFICANT CHANGE UP
ADD ON TEST-SPECIMEN IN LAB: SIGNIFICANT CHANGE UP
ALBUMIN SERPL ELPH-MCNC: 3.7 G/DL — SIGNIFICANT CHANGE UP (ref 3.3–5)
ALP SERPL-CCNC: 74 U/L — SIGNIFICANT CHANGE UP (ref 40–120)
ALT FLD-CCNC: 13 U/L — SIGNIFICANT CHANGE UP (ref 4–33)
ANION GAP SERPL CALC-SCNC: 12 MMOL/L — SIGNIFICANT CHANGE UP (ref 7–14)
AST SERPL-CCNC: 26 U/L — SIGNIFICANT CHANGE UP (ref 4–32)
BASOPHILS # BLD AUTO: 0.04 K/UL — SIGNIFICANT CHANGE UP (ref 0–0.2)
BASOPHILS NFR BLD AUTO: 0.5 % — SIGNIFICANT CHANGE UP (ref 0–2)
BILIRUB DIRECT SERPL-MCNC: 0.2 MG/DL — SIGNIFICANT CHANGE UP (ref 0–0.3)
BILIRUB INDIRECT FLD-MCNC: 3.1 MG/DL — HIGH (ref 0–1)
BILIRUB SERPL-MCNC: 3.3 MG/DL — HIGH (ref 0.2–1.2)
BILIRUB SERPL-MCNC: 3.3 MG/DL — HIGH (ref 0.2–1.2)
BUN SERPL-MCNC: 8 MG/DL — SIGNIFICANT CHANGE UP (ref 7–23)
CALCIUM SERPL-MCNC: 9 MG/DL — SIGNIFICANT CHANGE UP (ref 8.4–10.5)
CHLORIDE SERPL-SCNC: 106 MMOL/L — SIGNIFICANT CHANGE UP (ref 98–107)
CHOLEST SERPL-MCNC: 67 MG/DL — SIGNIFICANT CHANGE UP
CO2 SERPL-SCNC: 23 MMOL/L — SIGNIFICANT CHANGE UP (ref 22–31)
CREAT SERPL-MCNC: 0.63 MG/DL — SIGNIFICANT CHANGE UP (ref 0.5–1.3)
EGFR: 98 ML/MIN/1.73M2 — SIGNIFICANT CHANGE UP
EOSINOPHIL # BLD AUTO: 0.31 K/UL — SIGNIFICANT CHANGE UP (ref 0–0.5)
EOSINOPHIL NFR BLD AUTO: 3.6 % — SIGNIFICANT CHANGE UP (ref 0–6)
ESTIMATED AVERAGE GLUCOSE: 97 — SIGNIFICANT CHANGE UP
FERRITIN SERPL-MCNC: 121 NG/ML — SIGNIFICANT CHANGE UP (ref 13–330)
GLUCOSE SERPL-MCNC: 91 MG/DL — SIGNIFICANT CHANGE UP (ref 70–99)
HAPTOGLOB SERPL-MCNC: 88 MG/DL — SIGNIFICANT CHANGE UP (ref 34–200)
HCT VFR BLD CALC: 29.9 % — LOW (ref 34.5–45)
HDLC SERPL-MCNC: 42 MG/DL — LOW
HGB BLD-MCNC: 10.1 G/DL — LOW (ref 11.5–15.5)
IANC: 4.79 K/UL — SIGNIFICANT CHANGE UP (ref 1.8–7.4)
IMM GRANULOCYTES NFR BLD AUTO: 0.2 % — SIGNIFICANT CHANGE UP (ref 0–0.9)
IRON SATN MFR SERPL: 24 % — SIGNIFICANT CHANGE UP (ref 14–50)
IRON SATN MFR SERPL: 76 UG/DL — SIGNIFICANT CHANGE UP (ref 30–160)
LDH SERPL L TO P-CCNC: 273 U/L — HIGH (ref 135–225)
LIPID PNL WITH DIRECT LDL SERPL: 14 MG/DL — SIGNIFICANT CHANGE UP
LYMPHOCYTES # BLD AUTO: 2.65 K/UL — SIGNIFICANT CHANGE UP (ref 1–3.3)
LYMPHOCYTES # BLD AUTO: 31 % — SIGNIFICANT CHANGE UP (ref 13–44)
MAGNESIUM SERPL-MCNC: 1.7 MG/DL — SIGNIFICANT CHANGE UP (ref 1.6–2.6)
MCHC RBC-ENTMCNC: 30.5 PG — SIGNIFICANT CHANGE UP (ref 27–34)
MCHC RBC-ENTMCNC: 33.8 GM/DL — SIGNIFICANT CHANGE UP (ref 32–36)
MCV RBC AUTO: 90.3 FL — SIGNIFICANT CHANGE UP (ref 80–100)
MONOCYTES # BLD AUTO: 0.74 K/UL — SIGNIFICANT CHANGE UP (ref 0–0.9)
MONOCYTES NFR BLD AUTO: 8.7 % — SIGNIFICANT CHANGE UP (ref 2–14)
NEUTROPHILS # BLD AUTO: 4.79 K/UL — SIGNIFICANT CHANGE UP (ref 1.8–7.4)
NEUTROPHILS NFR BLD AUTO: 56 % — SIGNIFICANT CHANGE UP (ref 43–77)
NON HDL CHOLESTEROL: 25 MG/DL — SIGNIFICANT CHANGE UP
NRBC # BLD: 0 /100 WBCS — SIGNIFICANT CHANGE UP (ref 0–0)
NRBC # FLD: 0 K/UL — SIGNIFICANT CHANGE UP (ref 0–0)
PHOSPHATE SERPL-MCNC: 2.9 MG/DL — SIGNIFICANT CHANGE UP (ref 2.5–4.5)
PLATELET # BLD AUTO: 177 K/UL — SIGNIFICANT CHANGE UP (ref 150–400)
POTASSIUM SERPL-MCNC: 3.6 MMOL/L — SIGNIFICANT CHANGE UP (ref 3.5–5.3)
POTASSIUM SERPL-SCNC: 3.6 MMOL/L — SIGNIFICANT CHANGE UP (ref 3.5–5.3)
PROT SERPL-MCNC: 6.1 G/DL — SIGNIFICANT CHANGE UP (ref 6–8.3)
RBC # BLD: 3.31 M/UL — LOW (ref 3.8–5.2)
RBC # FLD: 15.5 % — HIGH (ref 10.3–14.5)
SODIUM SERPL-SCNC: 141 MMOL/L — SIGNIFICANT CHANGE UP (ref 135–145)
TIBC SERPL-MCNC: 312 UG/DL — SIGNIFICANT CHANGE UP (ref 220–430)
TRIGL SERPL-MCNC: 53 MG/DL — SIGNIFICANT CHANGE UP
UIBC SERPL-MCNC: 236 UG/DL — SIGNIFICANT CHANGE UP (ref 110–370)
WBC # BLD: 8.55 K/UL — SIGNIFICANT CHANGE UP (ref 3.8–10.5)
WBC # FLD AUTO: 8.55 K/UL — SIGNIFICANT CHANGE UP (ref 3.8–10.5)

## 2024-05-08 RX ORDER — PANTOPRAZOLE SODIUM 20 MG/1
40 TABLET, DELAYED RELEASE ORAL
Refills: 0 | Status: DISCONTINUED | OUTPATIENT
Start: 2024-05-08 | End: 2024-05-10

## 2024-05-08 RX ADMIN — Medication 81 MILLIGRAM(S): at 17:23

## 2024-05-08 RX ADMIN — Medication 50 MILLIGRAM(S): at 05:43

## 2024-05-08 RX ADMIN — TICAGRELOR 90 MILLIGRAM(S): 90 TABLET ORAL at 05:43

## 2024-05-08 RX ADMIN — LOSARTAN POTASSIUM 100 MILLIGRAM(S): 100 TABLET, FILM COATED ORAL at 05:43

## 2024-05-08 RX ADMIN — TICAGRELOR 90 MILLIGRAM(S): 90 TABLET ORAL at 17:21

## 2024-05-08 RX ADMIN — ATORVASTATIN CALCIUM 80 MILLIGRAM(S): 80 TABLET, FILM COATED ORAL at 21:31

## 2024-05-08 RX ADMIN — PANTOPRAZOLE SODIUM 40 MILLIGRAM(S): 20 TABLET, DELAYED RELEASE ORAL at 05:43

## 2024-05-08 NOTE — PROGRESS NOTE ADULT - PROBLEM SELECTOR PLAN 2
s/p DESx1 mLAD with IV Lithotripsy and balloon angioplasty 3/19/24 at [facility] with Dr. Nowak on DAPT    - cards consulted, appreciate recs  - c/w aspirin, hold brilinta for now i/s/o anemia workup

## 2024-05-09 LAB
ALBUMIN SERPL ELPH-MCNC: 3.2 G/DL — LOW (ref 3.3–5)
ALP SERPL-CCNC: 75 U/L — SIGNIFICANT CHANGE UP (ref 40–120)
ALT FLD-CCNC: 13 U/L — SIGNIFICANT CHANGE UP (ref 4–33)
ANION GAP SERPL CALC-SCNC: 12 MMOL/L — SIGNIFICANT CHANGE UP (ref 7–14)
AST SERPL-CCNC: 25 U/L — SIGNIFICANT CHANGE UP (ref 4–32)
BASOPHILS # BLD AUTO: 0.02 K/UL — SIGNIFICANT CHANGE UP (ref 0–0.2)
BASOPHILS NFR BLD AUTO: 0.3 % — SIGNIFICANT CHANGE UP (ref 0–2)
BILIRUB SERPL-MCNC: 2.4 MG/DL — HIGH (ref 0.2–1.2)
BUN SERPL-MCNC: 10 MG/DL — SIGNIFICANT CHANGE UP (ref 7–23)
CALCIUM SERPL-MCNC: 8.6 MG/DL — SIGNIFICANT CHANGE UP (ref 8.4–10.5)
CHLORIDE SERPL-SCNC: 106 MMOL/L — SIGNIFICANT CHANGE UP (ref 98–107)
CO2 SERPL-SCNC: 23 MMOL/L — SIGNIFICANT CHANGE UP (ref 22–31)
CREAT SERPL-MCNC: 0.75 MG/DL — SIGNIFICANT CHANGE UP (ref 0.5–1.3)
EGFR: 88 ML/MIN/1.73M2 — SIGNIFICANT CHANGE UP
EOSINOPHIL # BLD AUTO: 0.26 K/UL — SIGNIFICANT CHANGE UP (ref 0–0.5)
EOSINOPHIL NFR BLD AUTO: 3.9 % — SIGNIFICANT CHANGE UP (ref 0–6)
GLUCOSE SERPL-MCNC: 93 MG/DL — SIGNIFICANT CHANGE UP (ref 70–99)
HCT VFR BLD CALC: 28.7 % — LOW (ref 34.5–45)
HGB BLD-MCNC: 9.5 G/DL — LOW (ref 11.5–15.5)
IANC: 3.55 K/UL — SIGNIFICANT CHANGE UP (ref 1.8–7.4)
IMM GRANULOCYTES NFR BLD AUTO: 0.1 % — SIGNIFICANT CHANGE UP (ref 0–0.9)
LYMPHOCYTES # BLD AUTO: 2.13 K/UL — SIGNIFICANT CHANGE UP (ref 1–3.3)
LYMPHOCYTES # BLD AUTO: 31.6 % — SIGNIFICANT CHANGE UP (ref 13–44)
MAGNESIUM SERPL-MCNC: 1.8 MG/DL — SIGNIFICANT CHANGE UP (ref 1.6–2.6)
MCHC RBC-ENTMCNC: 30.4 PG — SIGNIFICANT CHANGE UP (ref 27–34)
MCHC RBC-ENTMCNC: 33.1 GM/DL — SIGNIFICANT CHANGE UP (ref 32–36)
MCV RBC AUTO: 91.7 FL — SIGNIFICANT CHANGE UP (ref 80–100)
MONOCYTES # BLD AUTO: 0.78 K/UL — SIGNIFICANT CHANGE UP (ref 0–0.9)
MONOCYTES NFR BLD AUTO: 11.6 % — SIGNIFICANT CHANGE UP (ref 2–14)
NEUTROPHILS # BLD AUTO: 3.55 K/UL — SIGNIFICANT CHANGE UP (ref 1.8–7.4)
NEUTROPHILS NFR BLD AUTO: 52.5 % — SIGNIFICANT CHANGE UP (ref 43–77)
NRBC # BLD: 0 /100 WBCS — SIGNIFICANT CHANGE UP (ref 0–0)
NRBC # FLD: 0 K/UL — SIGNIFICANT CHANGE UP (ref 0–0)
PHOSPHATE SERPL-MCNC: 4.1 MG/DL — SIGNIFICANT CHANGE UP (ref 2.5–4.5)
PLATELET # BLD AUTO: 181 K/UL — SIGNIFICANT CHANGE UP (ref 150–400)
POTASSIUM SERPL-MCNC: 3.7 MMOL/L — SIGNIFICANT CHANGE UP (ref 3.5–5.3)
POTASSIUM SERPL-SCNC: 3.7 MMOL/L — SIGNIFICANT CHANGE UP (ref 3.5–5.3)
PROT SERPL-MCNC: 5.9 G/DL — LOW (ref 6–8.3)
RBC # BLD: 3.13 M/UL — LOW (ref 3.8–5.2)
RBC # FLD: 15.5 % — HIGH (ref 10.3–14.5)
SODIUM SERPL-SCNC: 141 MMOL/L — SIGNIFICANT CHANGE UP (ref 135–145)
WBC # BLD: 6.75 K/UL — SIGNIFICANT CHANGE UP (ref 3.8–10.5)
WBC # FLD AUTO: 6.75 K/UL — SIGNIFICANT CHANGE UP (ref 3.8–10.5)

## 2024-05-09 RX ADMIN — PANTOPRAZOLE SODIUM 40 MILLIGRAM(S): 20 TABLET, DELAYED RELEASE ORAL at 06:01

## 2024-05-09 RX ADMIN — Medication 50 MILLIGRAM(S): at 05:51

## 2024-05-09 RX ADMIN — TICAGRELOR 90 MILLIGRAM(S): 90 TABLET ORAL at 05:51

## 2024-05-09 RX ADMIN — Medication 81 MILLIGRAM(S): at 11:16

## 2024-05-09 RX ADMIN — LOSARTAN POTASSIUM 100 MILLIGRAM(S): 100 TABLET, FILM COATED ORAL at 05:51

## 2024-05-09 RX ADMIN — TICAGRELOR 90 MILLIGRAM(S): 90 TABLET ORAL at 17:11

## 2024-05-09 RX ADMIN — ATORVASTATIN CALCIUM 80 MILLIGRAM(S): 80 TABLET, FILM COATED ORAL at 22:13

## 2024-05-09 NOTE — DISCHARGE NOTE PROVIDER - NSDCCPCAREPLAN_GEN_ALL_CORE_FT
PRINCIPAL DISCHARGE DIAGNOSIS  Diagnosis: Acute anemia  Assessment and Plan of Treatment: You came in with an acute drop in hemoglobin. You were evaluated by our GI team with a colonoscopt and upper endoscopy to look for a GI bleed. After these scope procedures, we couldn't find any obvious source of bleeding in the GI tract. We then performed a CT scan of the abdomen to look for an active bleed but it came back negative as well. Your hemoglobin stabilized on 5/9 and you were cleared to follow up with your outpatient PCP for continued close monitoring and management.   Return to the ED if you experience any blood in stool, black or tarry stools, worsening dizziness, lightheadedness, or loss of consciousness.     PRINCIPAL DISCHARGE DIAGNOSIS  Diagnosis: Acute anemia  Assessment and Plan of Treatment: You came in with an acute drop in hemoglobin. You were evaluated by our GI team with a colonoscopt and upper endoscopy to look for a GI bleed. After these scope procedures, we couldn't find any obvious source of bleeding in the GI tract. We then performed a CT scan of the abdomen to look for an active bleed but it came back negative as well. Your hemoglobin stabilized and you were cleared to follow up with your outpatient PCP for continued close monitoring and management.   Return to the ED if you experience any blood in stool, black or tarry stools, worsening dizziness, lightheadedness, or loss of consciousness.      SECONDARY DISCHARGE DIAGNOSES  Diagnosis: Thickened endometrium  Assessment and Plan of Treatment: You were found to have a thickened endometrium on CT scan. We recommend follow up with your gynecologist for discussion regarding further workup.  Symptoms to look out for include irregular vaginal bleeding and pelvic pain.

## 2024-05-09 NOTE — DISCHARGE NOTE PROVIDER - PROVIDER TOKENS
PROVIDER:[TOKEN:[[id number]],FOLLOWUP:[1 week],ESTABLISHEDPATIENT:[T]] PROVIDER:[TOKEN:[[id number]],FOLLOWUP:[1 week],ESTABLISHEDPATIENT:[T]],FREE:[LAST:[Gynecology clinic],PHONE:[phone]],FAX:[(   )    -],FOLLOWUP:[2 weeks],ESTABLISHEDPATIENT:[T]]

## 2024-05-09 NOTE — DISCHARGE NOTE PROVIDER - NSDCCPTREATMENT_GEN_ALL_CORE_FT
PRINCIPAL PROCEDURE  Procedure: Colonoscopy  Findings and Treatment: Impression:          - Non-bleeding internal hemorrhoids.                       - Diverticulosis in the sigmoid colon.                       - One 2 mm, non-bleeding polyp at the splenic flexure,                        removed with a jumbo cold forceps. Resected and                        retrieved.                       - The examined portion of the ileum was normal.                       - There were no findings on performed colonoscopy to                        account for onoing anemia  < from: Colonoscopy (05.08.24 @ 14:08) >        SECONDARY PROCEDURE  Procedure: Upper endoscopy  Findings and Treatment: Impression:          - Bleb found in the esophagus.                       - Z-line regular, 39 cm fromthe incisors.                       - Deformity in the pylorus with some narrowing although                        with an adult endoscope easily passing into the small                        bowel. Etiology possibly secondary to prior ulcer disease                       - Normal examined duodenum.                       - There were no findings on performed EGD to account for                        ongoing anemia  < from: Upper Endoscopy (05.08.24 @ 14:09) >      Procedure: CT abdomen  Findings and Treatment: IMPRESSION:  No abdominopelvic or retroperitoneal hematoma.  Mild endometrial thickening, incompletely assessed. Consider pelvic   ultrasound for further characterization.  < from: CT Abdomen and Pelvis w/ IV Cont (05.09.24 @ 12:44) >

## 2024-05-09 NOTE — DISCHARGE NOTE PROVIDER - NSDCFUADDAPPT_GEN_ALL_CORE_FT
APPTS ARE READY TO BE MADE: [x] YES    Best Family or Patient Contact (if needed):    Additional Information about above appointments (if needed):    1: Deborah - PCP - 1 week f/u   2:   3:     Other comments or requests:    APPTS ARE READY TO BE MADE: [x] YES    Best Family or Patient Contact (if needed):    Additional Information about above appointments (if needed):    1: Deborah - PCP - 1 week f/u   2:   3:     Other comments or requests:   Appointment was scheduled but is not visible on [facility] on may 20th at 11:20 am with  at [address] [place] [address] Phone: [phone] APPTS ARE READY TO BE MADE: [x] YES    Best Family or Patient Contact (if needed):    Additional Information about above appointments (if needed):    1: Deborah - PCP - 1 week f/u   2:   3:     Other comments or requests:   Appointment was scheduled but is not visible on [facility] on may 20th at 11:20 am with  at [address] [place] [address] Phone: [phone]    Patient advised they did not want to proceed with scheduling appointments with the providers on their referrals. They will coordinate care on their own.

## 2024-05-09 NOTE — DISCHARGE NOTE PROVIDER - HOSPITAL COURSE
HPI:  Patient is a 64 year old female with PMHx CAD s/p DESx1 mLAD with IV Lithotripsy and balloon angioplasty 3/19/24 at [facility] with Dr. Nowak on DAPT, HTN on Losartan HCTZ who presents as transfer from [facility] for ongoing workup of symptomatic anemia and in setting of concern for DAPT and CAD history. Cardiology called overnight for assistance in DAPT management. On interview, patient denies active symptoms including lightheadedness/dizziness, chest pain, SOB. Patient reports chief complaint as 1 day onset of dizziness, no other symptoms noted. Patient reports having followed up with Dr. Nowak after stent placement and endorses compliance with medications prescribed for medical management of ACS. Denies other cardiac history other than stent and HTN, denies smoking/ETOH/illcit substance use. Denies recent history of hematemesis/coffee ground emesis/melena prior to hospital arrival. Patient denies history of GIB.    [facility] COURSE:  Hgb baseline ~12, noted to have Hgb 7 at [place], s/p 1U PRBC there, after blood product resuscitation Hgb 7.9 here at [facility]    Troponin I negative at 48.7    [facility] HOSPITAL COURSE:  Has not received medications  VS HR 80s-90s, SBP 120s-150s, >95% on room air, afebrile oral temp  EKG: SR, nonischemic  Labs: WBC 11.43, neutrophil count wnl, H/H 7.9/22.9 with baseline Hgb ~12-13, K 3.7, SCr 0.62, Troponin T 19, -> 286, CKMB 2, proBNP 291 (07 May 2024 07:57)    Pt was seen by GI who performed a colonoscopy endoscopy. it showed no obvious sign of bleeding. HPI:  Patient is a 64 year old female with PMHx CAD s/p DESx1 mLAD with IV Lithotripsy and balloon angioplasty 3/19/24 at [facility] with Dr. Nowak on DAPT, HTN on Losartan HCTZ who presents as transfer from [facility] for ongoing workup of symptomatic anemia and in setting of concern for DAPT and CAD history. Cardiology called overnight for assistance in DAPT management. On interview, patient denies active symptoms including lightheadedness/dizziness, chest pain, SOB. Patient reports chief complaint as 1 day onset of dizziness, no other symptoms noted. Patient reports having followed up with Dr. Nowak after stent placement and endorses compliance with medications prescribed for medical management of ACS. Denies other cardiac history other than stent and HTN, denies smoking/ETOH/illcit substance use. Denies recent history of hematemesis/coffee ground emesis/melena prior to hospital arrival. Patient denies history of GIB.    [facility] COURSE:  Hgb baseline ~12, noted to have Hgb 7 at [place], s/p 1U PRBC there, after blood product resuscitation Hgb 7.9 here at [facility]    Troponin I negative at 48.7    [facility] HOSPITAL COURSE:  Has not received medications  VS HR 80s-90s, SBP 120s-150s, >95% on room air, afebrile oral temp  EKG: SR, nonischemic  Labs: WBC 11.43, neutrophil count wnl, H/H 7.9/22.9 with baseline Hgb ~12-13, K 3.7, SCr 0.62, Troponin T 19, -> 286, CKMB 2, proBNP 291 (07 May 2024 07:57)    Pt was seen by GI who performed a colonoscopy endoscopy. it showed no obvious source of bleeding. Pt then underwent CT A/P with no signs of hematomas or active bleeding.  Hb was stable at >9.5 and pt was medically cleared for DC with close outpt followup. Pt and family educated on signs and symptoms of anemia to look for.     HPI:  Patient is a 64 year old female with PMHx CAD s/p DESx1 mLAD with IV Lithotripsy and balloon angioplasty 3/19/24 at [facility] with Dr. Nowak on DAPT, HTN on Losartan HCTZ who presents as transfer from [facility] for ongoing workup of symptomatic anemia and in setting of concern for DAPT and CAD history. Cardiology called overnight for assistance in DAPT management. On interview, patient denies active symptoms including lightheadedness/dizziness, chest pain, SOB. Patient reports chief complaint as 1 day onset of dizziness, no other symptoms noted. Patient reports having followed up with Dr. Nowak after stent placement and endorses compliance with medications prescribed for medical management of ACS. Denies other cardiac history other than stent and HTN, denies smoking/ETOH/illcit substance use. Denies recent history of hematemesis/coffee ground emesis/melena prior to hospital arrival. Patient denies history of GIB.    [facility] COURSE:  Hgb baseline ~12, noted to have Hgb 7 at [place], s/p 1U PRBC there, after blood product resuscitation Hgb 7.9 here at [facility]    Troponin I negative at 48.7    [facility] HOSPITAL COURSE:  Has not received medications  VS HR 80s-90s, SBP 120s-150s, >95% on room air, afebrile oral temp  EKG: SR, nonischemic  Labs: WBC 11.43, neutrophil count wnl, H/H 7.9/22.9 with baseline Hgb ~12-13, K 3.7, SCr 0.62, Troponin T 19, -> 286, CKMB 2, proBNP 291 (07 May 2024 07:57)    Pt was seen by GI who performed a colonoscopy endoscopy. it showed no obvious source of bleeding. Pt then underwent CT A/P with no signs of hematomas or active bleeding but showed endometerial thickening.  Hb was stable at >10 and pt was medically cleared for DC with close outpt followup. Pt and family educated on signs and symptoms of anemia to look for.    MEDICATION CHANGES: NONE

## 2024-05-09 NOTE — PROGRESS NOTE ADULT - PROBLEM SELECTOR PLAN 2
s/p DESx1 mLAD with IV Lithotripsy and balloon angioplasty 3/19/24 at [facility] with Dr. Nowak on DAPT    - cards consulted, appreciate recs  - c/w aspirin, hold brilinta for now i/s/o anemia workup s/p DESx1 mLAD with IV Lithotripsy and balloon angioplasty 3/19/24 at [facility] with Dr. Nowak on DAPT    - cards consulted, appreciate recs  - c/w aspirin, brilinta

## 2024-05-09 NOTE — DISCHARGE NOTE PROVIDER - CARE PROVIDERS DIRECT ADDRESSES
[email or web address]

## 2024-05-09 NOTE — DISCHARGE NOTE PROVIDER - NSDCMRMEDTOKEN_GEN_ALL_CORE_FT
Aspirin EC 81 mg oral delayed release tablet: 1 tab(s) orally once a day  atorvastatin 80 mg oral tablet: 1 tab(s) orally once a day  Cardiac Rehabilitation: 3x / week for 12 weeks for diagnosis of coronary artery disease (cardiologist: Dr. Huber Nowak) MDD: .  losartan-hydroCHLOROthiazide 100 mg-25 mg oral tablet: 1 tab(s) orally once a day  ticagrelor 90 mg oral tablet: 1 tab(s) orally 2 times a day  Toprol-XL 50 mg oral tablet, extended release: 1 tab(s) orally once a day   Aspirin EC 81 mg oral delayed release tablet: 1 tab(s) orally once a day  atorvastatin 80 mg oral tablet: 1 tab(s) orally once a day  Cardiac Rehabilitation: 3x / week for 12 weeks for diagnosis of coronary artery disease (cardiologist: Dr. Huber Nowak) MDD: .  losartan-hydroCHLOROthiazide 100 mg-25 mg oral tablet: 1 tab(s) orally once a day  pantoprazole 40 mg oral delayed release tablet: 1 tab(s) orally once a day (before a meal)  ticagrelor 90 mg oral tablet: 1 tab(s) orally 2 times a day  Toprol-XL 50 mg oral tablet, extended release: 1 tab(s) orally once a day

## 2024-05-09 NOTE — DISCHARGE NOTE PROVIDER - CARE PROVIDER_API CALL
Facundo Cabrera  Internal Medicine  [address] [place] [address]  Phone: [phone]  Fax: [phone]  Established Patient  Follow Up Time: 1 week   Facundo Cabrera  Internal Medicine  [address] [place] [address]  Phone: [phone]  Fax: [phone]  Established Patient  Follow Up Time: 1 week    Gynecology clinic,   Phone: [phone]  Fax: (   )    -  Established Patient  Follow Up Time: 2 weeks

## 2024-05-10 VITALS
TEMPERATURE: 98 F | RESPIRATION RATE: 17 BRPM | DIASTOLIC BLOOD PRESSURE: 62 MMHG | SYSTOLIC BLOOD PRESSURE: 98 MMHG | OXYGEN SATURATION: 100 % | HEART RATE: 62 BPM

## 2024-05-10 LAB
ALBUMIN SERPL ELPH-MCNC: 3.3 G/DL — SIGNIFICANT CHANGE UP (ref 3.3–5)
ALP SERPL-CCNC: 81 U/L — SIGNIFICANT CHANGE UP (ref 40–120)
ALT FLD-CCNC: 15 U/L — SIGNIFICANT CHANGE UP (ref 4–33)
ANION GAP SERPL CALC-SCNC: 10 MMOL/L — SIGNIFICANT CHANGE UP (ref 7–14)
AST SERPL-CCNC: 26 U/L — SIGNIFICANT CHANGE UP (ref 4–32)
BASOPHILS # BLD AUTO: 0.03 K/UL — SIGNIFICANT CHANGE UP (ref 0–0.2)
BASOPHILS NFR BLD AUTO: 0.4 % — SIGNIFICANT CHANGE UP (ref 0–2)
BILIRUB SERPL-MCNC: 1.9 MG/DL — HIGH (ref 0.2–1.2)
BLD GP AB SCN SERPL QL: NEGATIVE — SIGNIFICANT CHANGE UP
BUN SERPL-MCNC: 12 MG/DL — SIGNIFICANT CHANGE UP (ref 7–23)
CALCIUM SERPL-MCNC: 9 MG/DL — SIGNIFICANT CHANGE UP (ref 8.4–10.5)
CHLORIDE SERPL-SCNC: 105 MMOL/L — SIGNIFICANT CHANGE UP (ref 98–107)
CO2 SERPL-SCNC: 23 MMOL/L — SIGNIFICANT CHANGE UP (ref 22–31)
CREAT SERPL-MCNC: 0.72 MG/DL — SIGNIFICANT CHANGE UP (ref 0.5–1.3)
EGFR: 93 ML/MIN/1.73M2 — SIGNIFICANT CHANGE UP
EOSINOPHIL # BLD AUTO: 0.21 K/UL — SIGNIFICANT CHANGE UP (ref 0–0.5)
EOSINOPHIL NFR BLD AUTO: 3 % — SIGNIFICANT CHANGE UP (ref 0–6)
FOLATE SERPL-MCNC: 10.8 NG/ML — SIGNIFICANT CHANGE UP (ref 3.1–17.5)
GLUCOSE SERPL-MCNC: 106 MG/DL — HIGH (ref 70–99)
HCT VFR BLD CALC: 30.1 % — LOW (ref 34.5–45)
HGB BLD-MCNC: 10.4 G/DL — LOW (ref 11.5–15.5)
IANC: 3.64 K/UL — SIGNIFICANT CHANGE UP (ref 1.8–7.4)
IMM GRANULOCYTES NFR BLD AUTO: 0.3 % — SIGNIFICANT CHANGE UP (ref 0–0.9)
LYMPHOCYTES # BLD AUTO: 2.2 K/UL — SIGNIFICANT CHANGE UP (ref 1–3.3)
LYMPHOCYTES # BLD AUTO: 31.8 % — SIGNIFICANT CHANGE UP (ref 13–44)
MAGNESIUM SERPL-MCNC: 1.7 MG/DL — SIGNIFICANT CHANGE UP (ref 1.6–2.6)
MCHC RBC-ENTMCNC: 31.6 PG — SIGNIFICANT CHANGE UP (ref 27–34)
MCHC RBC-ENTMCNC: 34.6 GM/DL — SIGNIFICANT CHANGE UP (ref 32–36)
MCV RBC AUTO: 91.5 FL — SIGNIFICANT CHANGE UP (ref 80–100)
MONOCYTES # BLD AUTO: 0.82 K/UL — SIGNIFICANT CHANGE UP (ref 0–0.9)
MONOCYTES NFR BLD AUTO: 11.8 % — SIGNIFICANT CHANGE UP (ref 2–14)
NEUTROPHILS # BLD AUTO: 3.64 K/UL — SIGNIFICANT CHANGE UP (ref 1.8–7.4)
NEUTROPHILS NFR BLD AUTO: 52.7 % — SIGNIFICANT CHANGE UP (ref 43–77)
NRBC # BLD: 0 /100 WBCS — SIGNIFICANT CHANGE UP (ref 0–0)
NRBC # FLD: 0 K/UL — SIGNIFICANT CHANGE UP (ref 0–0)
PHOSPHATE SERPL-MCNC: 4 MG/DL — SIGNIFICANT CHANGE UP (ref 2.5–4.5)
PLATELET # BLD AUTO: 206 K/UL — SIGNIFICANT CHANGE UP (ref 150–400)
POTASSIUM SERPL-MCNC: 3.7 MMOL/L — SIGNIFICANT CHANGE UP (ref 3.5–5.3)
POTASSIUM SERPL-SCNC: 3.7 MMOL/L — SIGNIFICANT CHANGE UP (ref 3.5–5.3)
PROT SERPL-MCNC: 6.2 G/DL — SIGNIFICANT CHANGE UP (ref 6–8.3)
RBC # BLD: 3.29 M/UL — LOW (ref 3.8–5.2)
RBC # FLD: 15.3 % — HIGH (ref 10.3–14.5)
RH IG SCN BLD-IMP: POSITIVE — SIGNIFICANT CHANGE UP
SODIUM SERPL-SCNC: 138 MMOL/L — SIGNIFICANT CHANGE UP (ref 135–145)
VIT B12 SERPL-MCNC: 813 PG/ML — SIGNIFICANT CHANGE UP (ref 200–900)
WBC # BLD: 6.92 K/UL — SIGNIFICANT CHANGE UP (ref 3.8–10.5)
WBC # FLD AUTO: 6.92 K/UL — SIGNIFICANT CHANGE UP (ref 3.8–10.5)

## 2024-05-10 RX ORDER — PANTOPRAZOLE SODIUM 20 MG/1
1 TABLET, DELAYED RELEASE ORAL
Qty: 30 | Refills: 0
Start: 2024-05-10 | End: 2024-06-08

## 2024-05-10 RX ADMIN — PANTOPRAZOLE SODIUM 40 MILLIGRAM(S): 20 TABLET, DELAYED RELEASE ORAL at 06:06

## 2024-05-10 RX ADMIN — Medication 81 MILLIGRAM(S): at 11:58

## 2024-05-10 RX ADMIN — TICAGRELOR 90 MILLIGRAM(S): 90 TABLET ORAL at 06:06

## 2024-05-10 NOTE — PROGRESS NOTE ADULT - PROBLEM SELECTOR PLAN 1
Anemia to 7 at [facility] s/p 1u pRBCs. Baseline Hb 12 last month. No obvious source of bleed at this point.  - GI consulted. appreciate recs  - monitor CBC daily  - transfuse as needed for Hb >8  - s/p EGD/colononoscopy with no obvious source of bleed
Anemia to 7 at [facility] s/p 1u pRBCs. Baseline Hb 12 last month. No obvious source of bleed at this point.  - GI consulted. appreciate recs  - monitor CBC daily  - transfuse as needed for Hb >8  - s/p EGD with no obvious source of bleed
Anemia to 7 at [facility] s/p 1u pRBCs. Baseline Hb 12 last month. No obvious source of bleed at this point.  - GI consulted. appreciate recs  - monitor CBC daily  - transfuse as needed for Hb >8

## 2024-05-10 NOTE — PROGRESS NOTE ADULT - ASSESSMENT
64F with hx of CAD s/p DESx1 (3/19/24) at [facility] on DAPT presents as transfer from [facility] for symptomatic anemia now s/p EGD and colonoscopy on 5/8 with no findings to account for ongoing anemia     #symptomatic anemia without overt GI bleeding  #subjective weight loss; but unable to quantify amount or time frame  #CAD s/p stent 3/19/24; on ASA/Brilinta  - Hgb baseline 12; on presentation to [facility] 7 s/p 1U pRBC with improvement to 7.9  - no abd pain, nausea/vomiting, dysphagia, odynophagia, hematemesis/coffee ground emesis/melena  - EGD 10+ years ago normal; no previous colonoscopy  - no significant NSAID hx  - s/p EGD and colonoscopy on 5/8. EGD with note of bleb found in the esophagus with deformity in the pylorus with some narrowing although with an adult endoscope easily passing into the small bowel. Etiology possibly secondary to prior ulcer disease. Colonoscopy with note of brown stool, diverticulosis, a 2 mm polyp that was removed. Overall no findings on performed EGD and colonoscopy to account for ongoing anemia. No further GI interventions planned at this time. Consider additional workup of anemia with hemeatology given elevated LDH and indirect billrubenemia.    Recommendations  -Diet as tolerated   -No GI contraindication to continuing DAPT therapy in setting of recent cardiac stent; risk of holding outweigh benefits  -PO PPI QD for gastroprotection   -No further GI interventions planned at this time.  -Follow up pathology results, will need repeat colonoscopy in 7 years for repeat CRC surveillance   -Consider additional workup of anemia with hematology given elevated LDH and indirect billrubenemia.    GI will plan to sign off at this time. Please feel free to reach out to our team with any follow up questions.     All recommendations are tentative until note is attested by attending.   
Patient is a 64 year old female with PMHx CAD s/p DESx1 mLAD with IV Lithotripsy and balloon angioplasty 3/19/24 at [facility] with Dr. Nowak on DAPT, HTN on Losartan HCTZ who presents as transfer from [facility] for ongoing workup of symptomatic anemia and in setting of concern for DAPT and CAD history. 
Patient is a 64 year old female with PMHx CAD s/p DESx1 mLAD with IV Lithotripsy and balloon angioplasty 3/19/24 at [facility] with Dr. Nowak on DAPT, HTN on Losartan HCTZ who presents as transfer from [facility] for ongoing workup of symptomatic anemia and in setting of concern for DAPT and CAD history. 
Patient is a 64 year old female with PMHx CAD s/p DESx1 mLAD with IV Lithotripsy and balloon angioplasty 3/19/24 at [facility] with Dr. Nowak on DAPT, HTN on Losartan HCTZ who presents as transfer from [facility] for ongoing workup of symptomatic anemia and in setting of concern for DAPT and CAD history.

## 2024-05-10 NOTE — DISCHARGE NOTE NURSING/CASE MANAGEMENT/SOCIAL WORK - NSDCPNINST_GEN_ALL_CORE
Patient A&Ox4, no c/o pain , pt discharged to home this, awaiting on son for , no distress noted at this time.

## 2024-05-10 NOTE — DISCHARGE NOTE NURSING/CASE MANAGEMENT/SOCIAL WORK - CAREGIVER ADDRESS
What Type Of Note Output Would You Prefer (Optional)?: Bullet Format
How Severe Is Your Rash?: moderate
Is This A New Presentation, Or A Follow-Up?: Rash
n/a

## 2024-05-10 NOTE — PHYSICAL THERAPY INITIAL EVALUATION ADULT - GENERAL OBSERVATIONS, REHAB EVAL
Pt received semi-supine in bed, with all lines intact, NAD, all precautions maintained. Pt received seated at edge of bed, with all lines intact, NAD, all precautions maintained.

## 2024-05-10 NOTE — PROGRESS NOTE ADULT - PROBLEM SELECTOR PLAN 4
vte ppx: SCDs  diet: regular  dispo: likely home

## 2024-05-10 NOTE — PHYSICAL THERAPY INITIAL EVALUATION ADULT - NSPTDISCHREC_GEN_A_CORE
Pt is at their functional baseline and will not be placed on PT program. Please reconsult if indicated./No skilled PT needs

## 2024-05-10 NOTE — PROGRESS NOTE ADULT - ATTENDING COMMENTS
s/p EGD and colonoscopy yesterday without etiology for anemia; tiny colon polyp removed. can repeat colonoscopy in 10 years for surveillance.   consider heme eval for anemia.   please call back with questions
Pt without dizziness today, ambulating independently in hallways. VS and hemoglobin have been stable. EGD/colonoscopy not revealing of any lesion that would explain anemia noted on presentation to [facility]. In consultation with GI and Cardiology, will continue DAPT given very recent LIN to [facility]. Pt is in agreement with this plan and acknowledges the increased risk of bleeding on DAPT. Plan d/c home today with close GI and Cardiology f/u. Pt in agreement with d/c home today. Time planning discharge 35 minutes.
Pt still with anemia this morning though afebrile, VS stable. Pt s/p prep overnight. Anticipate EGD/colonoscopy today. After discussion among consulting teams, pt agreeable to continue DAPT given recent LIN to LAD.
Pt had LIN and started on DAPT 3/19/24. About 6 weeks later, she presented initially to [facility] with a 5-point decline in hemoglobin and +FOBT.     EGD and colonoscopy are not revealing of a source of GI blood loss. Labs do not demonstrate iron deficiency. Given normal haptoglobin, hemolysis is not suspected currently. If pt develops signs of GI bleeding, will need to consider VCE.    Will evaluate other sources of blood loss, including RP bleed, with CT abd/pelvis.

## 2024-05-10 NOTE — PHYSICAL THERAPY INITIAL EVALUATION ADULT - ADDITIONAL COMMENTS
Pt states she lives with her family in a house with 3 steps to enter and remains on the main level. Prior to admission, pt was ambulating independently without any assistive devices.   Post PT evaluation, pt left seated at edge of bed, alarm on, call bell and remote within reach, all precautions maintained, NAD. RN aware.

## 2024-05-10 NOTE — PROGRESS NOTE ADULT - SUBJECTIVE AND OBJECTIVE BOX
ANESTHESIA POSTOP CHECK    64y Female POSTOP DAY 1 S/P     Vital Signs Last 24 Hrs  T(C): 36.6 (09 May 2024 05:32), Max: 36.8 (08 May 2024 14:00)  T(F): 97.9 (09 May 2024 05:32), Max: 98.2 (08 May 2024 14:00)  HR: 67 (09 May 2024 05:32) (65 - 82)  BP: 100/66 (09 May 2024 05:32) (97/66 - 158/80)  BP(mean): 90 (08 May 2024 15:50) (78 - 90)  RR: 17 (09 May 2024 05:32) (13 - 18)  SpO2: 100% (09 May 2024 05:32) (98% - 100%)    Parameters below as of 09 May 2024 05:32  Patient On (Oxygen Delivery Method): room air      I&O's Summary    08 May 2024 07:01  -  09 May 2024 07:00  --------------------------------------------------------  IN: 1150 mL / OUT: 0 mL / NET: 1150 mL        [ x] NO APPARENT ANESTHESIA COMPLICATIONS      Comments: 
Gastroenterology Progress Note    Interval Events:   s/p EGD and colonoscopy with no findings to account for ongoing anemia   Denies any ongoing nausea, vomiting or abd pain with no signs of overt bleeding     Allergies:  No Known Allergies      Hospital Medications:  acetaminophen     Tablet .. 650 milliGRAM(s) Oral every 6 hours PRN  aspirin enteric coated 81 milliGRAM(s) Oral daily  atorvastatin 80 milliGRAM(s) Oral at bedtime  hydrochlorothiazide 25 milliGRAM(s) Oral daily  losartan 100 milliGRAM(s) Oral daily  metoprolol succinate ER 50 milliGRAM(s) Oral daily  pantoprazole    Tablet 40 milliGRAM(s) Oral before breakfast  ticagrelor 90 milliGRAM(s) Oral every 12 hours      ROS: 14 point ROS negative unless otherwise state in subjective    PHYSICAL EXAM:   Vital Signs:  Vital Signs Last 24 Hrs  T(C): 36.6 (09 May 2024 05:32), Max: 36.8 (08 May 2024 14:00)  T(F): 97.9 (09 May 2024 05:32), Max: 98.2 (08 May 2024 14:00)  HR: 67 (09 May 2024 05:32) (65 - 82)  BP: 100/66 (09 May 2024 05:32) (97/66 - 158/80)  BP(mean): 90 (08 May 2024 15:50) (78 - 90)  RR: 17 (09 May 2024 05:32) (13 - 18)  SpO2: 100% (09 May 2024 05:32) (98% - 100%)    Parameters below as of 09 May 2024 05:32  Patient On (Oxygen Delivery Method): room air      Daily     Daily Weight in k (09 May 2024 05:31)    GENERAL:  No acute distress  HEENT:  NCAT, no scleral icterus  CHEST: no resp distress  HEART:  RRR  ABDOMEN:  Soft, non-tender, non-distended, normoactive bowel sounds  NEURO:  Alert and oriented x 3, no asterixis, no tremor    LABS:                        9.5    6.75  )-----------( 181      ( 09 May 2024 06:06 )             28.7     Mean Cell Volume: 91.7 fL (24 @ 06:06)    -    141  |  106  |  8   ----------------------------<  91  3.6   |  23  |  0.63    Ca    9.0      08 May 2024 06:44  Phos  2.9       Mg     1.70         TPro  6.1  /  Alb  3.7  /  TBili  3.3<H>  /  DBili  0.2  /  AST  26  /  ALT  13  /  AlkPhos  74  05-08    LIVER FUNCTIONS - ( 08 May 2024 06:44 )  Alb: 3.7 g/dL / Pro: 6.1 g/dL / ALK PHOS: 74 U/L / ALT: 13 U/L / AST: 26 U/L / GGT: x             Urinalysis Basic - ( 08 May 2024 06:44 )    Color: x / Appearance: x / SG: x / pH: x  Gluc: 91 mg/dL / Ketone: x  / Bili: x / Urobili: x   Blood: x / Protein: x / Nitrite: x   Leuk Esterase: x / RBC: x / WBC x   Sq Epi: x / Non Sq Epi: x / Bacteria: x      Imaging:  < from: Upper Endoscopy (24 @ 14:09) >                                                                                   Impression:          - Bleb found in the esophagus.                       - Z-line regular, 39 cm fromthe incisors.                       - Deformity in the pylorus with some narrowing although                        with an adult endoscope easily passing into the small                        bowel. Etiology possibly secondary to prior ulcer disease                       - Normal examined duodenum.                       - There were no findings on performed EGD to account for                        ongoing anemia    < end of copied text >          
INTERNAL MEDICINE PROGRESS NOTE  Ken Berry MD  Please contact via TEAMS    Patient is a 64y old  Female who presents with a chief complaint of     SUBJECTIVE / OVERNIGHT EVENTS::  No acute overnight events. Pt seen and examined. Denies fevers, chills, CP, SOB, Abdominal pain, N/V, Constipation, Diarrhea      =================Meds=================  MEDICATIONS  (STANDING):  aspirin enteric coated 81 milliGRAM(s) Oral daily  atorvastatin 80 milliGRAM(s) Oral at bedtime  hydrochlorothiazide 25 milliGRAM(s) Oral daily  losartan 100 milliGRAM(s) Oral daily  metoprolol succinate ER 50 milliGRAM(s) Oral daily  pantoprazole    Tablet 40 milliGRAM(s) Oral before breakfast  ticagrelor 90 milliGRAM(s) Oral every 12 hours    MEDICATIONS  (PRN):  acetaminophen     Tablet .. 650 milliGRAM(s) Oral every 6 hours PRN Temp greater or equal to 38C (100.4F), Mild Pain (1 - 3)      I&O's Summary    09 May 2024 07:01  -  10 May 2024 07:00  --------------------------------------------------------  IN: 780 mL / OUT: 0 mL / NET: 780 mL        =================Vitals=================  Vital Signs Last 24 Hrs  T(C): 36.7 (10 May 2024 04:50), Max: 37.3 (09 May 2024 13:18)  T(F): 98 (10 May 2024 04:50), Max: 99.2 (09 May 2024 13:18)  HR: 62 (10 May 2024 04:50) (62 - 70)  BP: 98/62 (10 May 2024 04:50) (98/62 - 108/63)  BP(mean): --  RR: 17 (10 May 2024 04:50) (17 - 18)  SpO2: 100% (10 May 2024 04:50) (100% - 100%)    Parameters below as of 10 May 2024 04:50  Patient On (Oxygen Delivery Method): room air        =================PHYSICAL EXAM=================  PHYSICAL EXAM:  GENERAL: no distress  PSYCH: A&O x3  HEAD: Atraumatic, Normocephalic  NECK: Supple, No JVD  CHEST/LUNG: clear to auscultation bilaterally  HEART: regular rate and rhythm, no murmurs  ABDOMEN: nontender to palpation, no rebound tenderness/guarding  EXTREMITIES: no edema on bilateral LE  NEUROLOGY: no focal neurologic deficit  SKIN: No rashes or lesions      ===================LABS=======================                        9.5    6.75  )-----------( 181      ( 09 May 2024 06:06 )             28.7     Auto Eosinophil # 0.26  / Auto Eosinophil % 3.9   / Auto Neutrophil # 3.55  / Auto Neutrophil % 52.5  / BANDS % x        05-09    141  |  106  |  10  ----------------------------<  93  3.7   |  23  |  0.75    Ca    8.6      09 May 2024 06:06  Mg     1.80     05-09  Phos  4.1     05-09  TPro  5.9<L>  /  Alb  3.2<L>  /  TBili  2.4<H>  /  DBili  x   /  AST  25  /  ALT  13  /  AlkPhos  75  05-09          Urinalysis Basic - ( 09 May 2024 06:06 )    Color: x / Appearance: x / SG: x / pH: x  Gluc: 93 mg/dL / Ketone: x  / Bili: x / Urobili: x   Blood: x / Protein: x / Nitrite: x   Leuk Esterase: x / RBC: x / WBC x   Sq Epi: x / Non Sq Epi: x / Bacteria: x          ===============Radiology & Additional Tests==================  Reviewed.      < from: CT Abdomen and Pelvis w/ IV Cont (05.09.24 @ 12:44) >  IMPRESSION:  No abdominopelvic or retroperitoneal hematoma.    Mild endometrial thickening, incompletely assessed. Consider pelvic   ultrasound for further characterization.    < end of copied text >        
INTERNAL MEDICINE PROGRESS NOTE  Ken Berry MD  Please contact via TEAMS    Patient is a 64y old  Female who presents with a chief complaint of     SUBJECTIVE / OVERNIGHT EVENTS::  No acute overnight events. Pt seen and examined. Denies fevers, chills, CP, SOB, Abdominal pain, N/V, Constipation, Diarrhea      =================Meds=================  MEDICATIONS  (STANDING):  aspirin enteric coated 81 milliGRAM(s) Oral daily  atorvastatin 80 milliGRAM(s) Oral at bedtime  hydrochlorothiazide 25 milliGRAM(s) Oral daily  losartan 100 milliGRAM(s) Oral daily  metoprolol succinate ER 50 milliGRAM(s) Oral daily  pantoprazole  Injectable 40 milliGRAM(s) IV Push every 12 hours  ticagrelor 90 milliGRAM(s) Oral every 12 hours    MEDICATIONS  (PRN):  acetaminophen     Tablet .. 650 milliGRAM(s) Oral every 6 hours PRN Temp greater or equal to 38C (100.4F), Mild Pain (1 - 3)      I&O's Summary    07 May 2024 07:01  -  08 May 2024 07:00  --------------------------------------------------------  IN: 2000 mL / OUT: 1200 mL / NET: 800 mL        =================Vitals=================  Vital Signs Last 24 Hrs  T(C): 37.1 (08 May 2024 05:42), Max: 37.1 (08 May 2024 05:42)  T(F): 98.8 (08 May 2024 05:42), Max: 98.8 (08 May 2024 05:42)  HR: 68 (08 May 2024 05:42) (68 - 89)  BP: 116/72 (08 May 2024 05:42) (114/74 - 156/78)  BP(mean): --  RR: 18 (08 May 2024 05:42) (18 - 19)  SpO2: 100% (08 May 2024 05:42) (100% - 100%)    Parameters below as of 08 May 2024 05:42  Patient On (Oxygen Delivery Method): room air        =================PHYSICAL EXAM=================  PHYSICAL EXAM:  GENERAL: no distress  PSYCH: A&O x3  HEAD: Atraumatic, Normocephalic  NECK: Supple, No JVD  CHEST/LUNG: clear to auscultation bilaterally  HEART: regular rate and rhythm, no murmurs  ABDOMEN: nontender to palpation, no rebound tenderness/guarding  EXTREMITIES: no edema on bilateral LE  NEUROLOGY: no focal neurologic deficit  SKIN: No rashes or lesions      ===================LABS=======================                        7.9    11.43 )-----------( 165      ( 06 May 2024 21:27 )             22.9     Auto Eosinophil # 0.13  / Auto Eosinophil % 1.1   / Auto Neutrophil # 6.13  / Auto Neutrophil % 53.8  / BANDS % x        05-07    141  |  110<H>  |  14  ----------------------------<  90  3.7   |  19<L>  |  0.62    Ca    8.6      07 May 2024 00:29  TPro  6.0  /  Alb  3.4  /  TBili  1.2  /  DBili  x   /  AST  23  /  ALT  12  /  AlkPhos  68  05-07      CARDIAC MARKERS ( 07 May 2024 00:29 )  x     / x     / x     / x     / 2.0 ng/mL      Urinalysis Basic - ( 07 May 2024 00:29 )    Color: x / Appearance: x / SG: x / pH: x  Gluc: 90 mg/dL / Ketone: x  / Bili: x / Urobili: x   Blood: x / Protein: x / Nitrite: x   Leuk Esterase: x / RBC: x / WBC x   Sq Epi: x / Non Sq Epi: x / Bacteria: x          ===============Radiology & Additional Tests==================  Reviewed.      
INTERNAL MEDICINE PROGRESS NOTE  Ken Berry MD  Please contact via TEAMS    Patient is a 64y old  Female who presents with a chief complaint of     SUBJECTIVE / OVERNIGHT EVENTS::  No acute overnight events. Pt seen and examined. Denies fevers, chills, CP, SOB, Abdominal pain, N/V, Constipation, Diarrhea      =================Meds=================  MEDICATIONS  (STANDING):  aspirin enteric coated 81 milliGRAM(s) Oral daily  atorvastatin 80 milliGRAM(s) Oral at bedtime  hydrochlorothiazide 25 milliGRAM(s) Oral daily  losartan 100 milliGRAM(s) Oral daily  metoprolol succinate ER 50 milliGRAM(s) Oral daily  pantoprazole    Tablet 40 milliGRAM(s) Oral before breakfast  ticagrelor 90 milliGRAM(s) Oral every 12 hours    MEDICATIONS  (PRN):  acetaminophen     Tablet .. 650 milliGRAM(s) Oral every 6 hours PRN Temp greater or equal to 38C (100.4F), Mild Pain (1 - 3)      I&O's Summary    08 May 2024 07:01  -  09 May 2024 07:00  --------------------------------------------------------  IN: 1150 mL / OUT: 0 mL / NET: 1150 mL        =================Vitals=================  Vital Signs Last 24 Hrs  T(C): 36.6 (09 May 2024 05:32), Max: 36.8 (08 May 2024 14:00)  T(F): 97.9 (09 May 2024 05:32), Max: 98.2 (08 May 2024 14:00)  HR: 67 (09 May 2024 05:32) (65 - 82)  BP: 100/66 (09 May 2024 05:32) (97/66 - 158/80)  BP(mean): 90 (08 May 2024 15:50) (78 - 90)  RR: 17 (09 May 2024 05:32) (13 - 18)  SpO2: 100% (09 May 2024 05:32) (98% - 100%)    Parameters below as of 09 May 2024 05:32  Patient On (Oxygen Delivery Method): room air        =================PHYSICAL EXAM=================  PHYSICAL EXAM:  GENERAL: no distress  PSYCH: A&O x3  HEAD: Atraumatic, Normocephalic  NECK: Supple, No JVD  CHEST/LUNG: clear to auscultation bilaterally  HEART: regular rate and rhythm, no murmurs  ABDOMEN: nontender to palpation, no rebound tenderness/guarding  EXTREMITIES: no edema on bilateral LE  NEUROLOGY: no focal neurologic deficit  SKIN: No rashes or lesions      ===================LABS=======================                        10.1   8.55  )-----------( 177      ( 08 May 2024 06:44 )             29.9     Auto Eosinophil # 0.31  / Auto Eosinophil % 3.6   / Auto Neutrophil # 4.79  / Auto Neutrophil % 56.0  / BANDS % x        05-08    141  |  106  |  8   ----------------------------<  91  3.6   |  23  |  0.63    Ca    9.0      08 May 2024 06:44  Mg     1.70     05-08  Phos  2.9     05-08  TPro  6.1  /  Alb  3.7  /  TBili  3.3<H>  /  DBili  0.2  /  AST  26  /  ALT  13  /  AlkPhos  74  05-08          Urinalysis Basic - ( 08 May 2024 06:44 )    Color: x / Appearance: x / SG: x / pH: x  Gluc: 91 mg/dL / Ketone: x  / Bili: x / Urobili: x   Blood: x / Protein: x / Nitrite: x   Leuk Esterase: x / RBC: x / WBC x   Sq Epi: x / Non Sq Epi: x / Bacteria: x          ===============Radiology & Additional Tests==================  Reviewed.

## 2024-05-10 NOTE — DISCHARGE NOTE NURSING/CASE MANAGEMENT/SOCIAL WORK - PATIENT PORTAL LINK FT
You can access the FollowMyHealth Patient Portal offered by [facility] by registering at the following website: [email or web address]. By joining [facility]’s FollowMyHealth portal, you will also be able to view your health information using other applications (apps) compatible with our system.

## 2024-05-10 NOTE — DISCHARGE NOTE NURSING/CASE MANAGEMENT/SOCIAL WORK - NSDCFUADDAPPT_GEN_ALL_CORE_FT
APPTS ARE READY TO BE MADE: [x] YES    Best Family or Patient Contact (if needed):    Additional Information about above appointments (if needed):    1: Deborah - PCP - 1 week f/u   2:   3:     Other comments or requests:   Appointment was scheduled but is not visible on [facility] on may 20th at 11:20 am with  at [address] [place] [address] Phone: [phone]

## 2024-05-10 NOTE — PROGRESS NOTE ADULT - PROBLEM SELECTOR PLAN 2
s/p DESx1 mLAD with IV Lithotripsy and balloon angioplasty 3/19/24 at [facility] with Dr. Nowak on DAPT    - cards consulted, appreciate recs  - c/w aspirin, brilinta

## 2024-05-10 NOTE — PHYSICAL THERAPY INITIAL EVALUATION ADULT - PERTINENT HX OF CURRENT PROBLEM, REHAB EVAL
64F CAD (s/p DESx1 mLAD w/ IV Lithotripsy and balloon angioplasty 3/2024; on DAPT), HTN --> transferred from [facility] for w/u symptomatic anemia i/s/o c/f DAPT and CAD history, interventional cards eval. Cardiology c/s for assistance in DAPT management. Patient reported 1x day dizziness. Had f/u w/ Dr. Nowak after stent placement and endorses compliance with medications prescribed for medical management of ACS. Hgb baseline 12, noted to be 7 at [facility], received 1U pRBC --> 7.9. Trops negative.

## 2024-05-13 PROBLEM — Z00.00 ENCOUNTER FOR PREVENTIVE HEALTH EXAMINATION: Status: ACTIVE | Noted: 2024-05-13

## 2024-05-13 LAB — SURGICAL PATHOLOGY STUDY: SIGNIFICANT CHANGE UP

## 2024-05-14 ENCOUNTER — NON-APPOINTMENT (OUTPATIENT)
Age: 64
End: 2024-05-14

## 2024-05-17 NOTE — PATIENT PROFILE ADULT - NSPROMEDSBROUGHTTOHOSP_GEN_A_NUR
Notified by [facility], that patient is appealing discharge at this time.     Detailed Notice of Discharge provided and verbally explained.      Daughter verbalized understanding of notice given.      Confirmed initial IM letter has been provided at time of admission.  Latest IM letter provided was on 05/16/2024      Medical records submitted electronically via [facility] Portal on 05/17/2024 at 12:54.     Attending MD. Dr. Chong notified verbally, and Primary Rn informed.      Case ID #[id number].  Await determination at this time; will continue to follow up until a determination has been made.     Yvonne Valencia MBA, MSN, RN, ACM-RN  Manager, Care Management  [facility] [id number] [place] [address]  Office: [phone]  Tie Line:   Fax: [phone]       no

## 2025-07-01 ENCOUNTER — APPOINTMENT (OUTPATIENT)
Age: 65
End: 2025-07-01

## 2025-07-01 PROCEDURE — ZZZZZ: CPT

## 2025-09-02 ENCOUNTER — APPOINTMENT (OUTPATIENT)
Age: 65
End: 2025-09-02

## (undated) DEVICE — TUBING MEDI-VAC W MAXIGRIP CONNECTORS 1/4"X6'

## (undated) DEVICE — BITE BLOCK ADULT 20 X 27MM (GREEN)

## (undated) DEVICE — UNDERPAD LINEN SAVER 17 X 24"

## (undated) DEVICE — SALIVA EJECTOR (BLUE)

## (undated) DEVICE — LUBRICATING JELLY HR ONE SHOT 3G

## (undated) DEVICE — GOWN LG

## (undated) DEVICE — CLAMP BX HOT RAD JAW 3

## (undated) DEVICE — BIOPSY FORCEP RADIAL JAW 4 STANDARD WITH NEEDLE

## (undated) DEVICE — DRSG BANDAID 0.75X3"

## (undated) DEVICE — ELCTR ECG CONDUCTIVE ADHESIVE

## (undated) DEVICE — ELCTR GROUNDING PAD ADULT COVIDIEN

## (undated) DEVICE — TUBING SUCTION NONCONDUCTIVE 6MM X 12FT

## (undated) DEVICE — CATH IV SAFE BC 22G X 1" (BLUE)

## (undated) DEVICE — TUBING IV SET GRAVITY 3Y 100" MACRO

## (undated) DEVICE — PACK IV START WITH CHG

## (undated) DEVICE — CONTAINER FORMALIN 80ML YELLOW

## (undated) DEVICE — BIOPSY FORCEP COLD DISP

## (undated) DEVICE — BASIN EMESIS 10IN GRADUATED MAUVE

## (undated) DEVICE — DRSG CURITY GAUZE SPONGE 4 X 4" 12-PLY NON-STERILE

## (undated) DEVICE — DENTURE CUP PINK

## (undated) DEVICE — DRSG 2X2